# Patient Record
Sex: FEMALE | Race: WHITE | NOT HISPANIC OR LATINO | URBAN - METROPOLITAN AREA
[De-identification: names, ages, dates, MRNs, and addresses within clinical notes are randomized per-mention and may not be internally consistent; named-entity substitution may affect disease eponyms.]

---

## 2023-10-02 PROCEDURE — 87506 IADNA-DNA/RNA PROBE TQ 6-11: CPT

## 2023-10-02 PROCEDURE — 87493 C DIFF AMPLIFIED PROBE: CPT

## 2023-10-03 ENCOUNTER — LAB (OUTPATIENT)
Dept: LAB | Facility: LAB | Age: 43
End: 2023-10-03
Payer: MEDICAID

## 2023-10-03 DIAGNOSIS — Z87.19 PERSONAL HISTORY OF OTHER DISEASES OF THE DIGESTIVE SYSTEM: Primary | ICD-10-CM

## 2023-10-03 LAB
ALBUMIN SERPL BCP-MCNC: 3.7 G/DL (ref 3.4–5)
ALP SERPL-CCNC: 45 U/L (ref 33–110)
ALT SERPL W P-5'-P-CCNC: 8 U/L (ref 7–45)
ANION GAP SERPL CALC-SCNC: 11 MMOL/L (ref 10–20)
AST SERPL W P-5'-P-CCNC: 10 U/L (ref 9–39)
BASOPHILS # BLD AUTO: 0.1 X10*3/UL (ref 0–0.1)
BASOPHILS NFR BLD AUTO: 1 %
BILIRUB SERPL-MCNC: 0.3 MG/DL (ref 0–1.2)
BUN SERPL-MCNC: 8 MG/DL (ref 6–23)
CALCIUM SERPL-MCNC: 8.9 MG/DL (ref 8.6–10.3)
CHLORIDE SERPL-SCNC: 101 MMOL/L (ref 98–107)
CO2 SERPL-SCNC: 29 MMOL/L (ref 21–32)
CREAT SERPL-MCNC: 0.78 MG/DL (ref 0.5–1.05)
EOSINOPHIL # BLD AUTO: 0.51 X10*3/UL (ref 0–0.7)
EOSINOPHIL NFR BLD AUTO: 5 %
ERYTHROCYTE [DISTWIDTH] IN BLOOD BY AUTOMATED COUNT: 12 % (ref 11.5–14.5)
GFR SERPL CREATININE-BSD FRML MDRD: >90 ML/MIN/1.73M*2
GLUCOSE SERPL-MCNC: 93 MG/DL (ref 74–99)
HCT VFR BLD AUTO: 40.6 % (ref 36–46)
HGB BLD-MCNC: 13.2 G/DL (ref 12–16)
IMM GRANULOCYTES # BLD AUTO: 0.17 X10*3/UL (ref 0–0.7)
IMM GRANULOCYTES NFR BLD AUTO: 1.7 % (ref 0–0.9)
LYMPHOCYTES # BLD AUTO: 0.56 X10*3/UL (ref 1.2–4.8)
LYMPHOCYTES NFR BLD AUTO: 5.5 %
MCH RBC QN AUTO: 30.7 PG (ref 26–34)
MCHC RBC AUTO-ENTMCNC: 32.5 G/DL (ref 32–36)
MCV RBC AUTO: 94 FL (ref 80–100)
MONOCYTES # BLD AUTO: 1.45 X10*3/UL (ref 0.1–1)
MONOCYTES NFR BLD AUTO: 14.3 %
NEUTROPHILS # BLD AUTO: 7.34 X10*3/UL (ref 1.2–7.7)
NEUTROPHILS NFR BLD AUTO: 72.5 %
NRBC BLD-RTO: 0 /100 WBCS (ref 0–0)
PLATELET # BLD AUTO: 465 X10*3/UL (ref 150–450)
PMV BLD AUTO: 10 FL (ref 7.5–11.5)
POTASSIUM SERPL-SCNC: 3.9 MMOL/L (ref 3.5–5.3)
PROT SERPL-MCNC: 6.6 G/DL (ref 6.4–8.2)
RBC # BLD AUTO: 4.3 X10*6/UL (ref 4–5.2)
SODIUM SERPL-SCNC: 137 MMOL/L (ref 136–145)
WBC # BLD AUTO: 10.1 X10*3/UL (ref 4.4–11.3)

## 2023-10-03 PROCEDURE — 36415 COLL VENOUS BLD VENIPUNCTURE: CPT

## 2023-10-03 PROCEDURE — 85025 COMPLETE CBC W/AUTO DIFF WBC: CPT

## 2023-10-03 PROCEDURE — 80053 COMPREHEN METABOLIC PANEL: CPT

## 2023-10-04 LAB
C COLI+JEJ+UPSA DNA STL QL NAA+PROBE: NOT DETECTED
C DIF TOX TCDA+TCDB STL QL NAA+PROBE: NOT DETECTED
EC STX1 GENE STL QL NAA+PROBE: NOT DETECTED
EC STX2 GENE STL QL NAA+PROBE: NOT DETECTED
NOROVIRUS GI + GII RNA STL NAA+PROBE: NOT DETECTED
RV RNA STL NAA+PROBE: NOT DETECTED
SALMONELLA DNA STL QL NAA+PROBE: NOT DETECTED
SHIGELLA DNA SPEC QL NAA+PROBE: NOT DETECTED
V CHOLERAE DNA STL QL NAA+PROBE: NOT DETECTED
Y ENTEROCOL DNA STL QL NAA+PROBE: NOT DETECTED

## 2023-10-09 ENCOUNTER — HOSPITAL ENCOUNTER (INPATIENT)
Facility: HOSPITAL | Age: 43
LOS: 5 days | Discharge: HOME | End: 2023-10-14
Attending: STUDENT IN AN ORGANIZED HEALTH CARE EDUCATION/TRAINING PROGRAM | Admitting: FAMILY MEDICINE
Payer: MEDICAID

## 2023-10-09 ENCOUNTER — APPOINTMENT (OUTPATIENT)
Dept: RADIOLOGY | Facility: HOSPITAL | Age: 43
End: 2023-10-09
Payer: MEDICAID

## 2023-10-09 DIAGNOSIS — K52.9 INFLAMMATORY BOWEL DISEASE: Primary | ICD-10-CM

## 2023-10-09 LAB
ALBUMIN SERPL-MCNC: 3.6 G/DL (ref 3.5–5)
ALP BLD-CCNC: 81 U/L (ref 35–125)
ALT SERPL-CCNC: 24 U/L (ref 5–40)
ANION GAP SERPL CALC-SCNC: 10 MMOL/L
APPEARANCE UR: ABNORMAL
AST SERPL-CCNC: 29 U/L (ref 5–40)
BASOPHILS # BLD MANUAL: 0 X10*3/UL (ref 0–0.1)
BASOPHILS NFR BLD MANUAL: 0 %
BILIRUB SERPL-MCNC: 0.3 MG/DL (ref 0.1–1.2)
BILIRUB UR STRIP.AUTO-MCNC: NEGATIVE MG/DL
BUN SERPL-MCNC: 12 MG/DL (ref 8–25)
CALCIUM SERPL-MCNC: 9.6 MG/DL (ref 8.5–10.4)
CHLORIDE SERPL-SCNC: 99 MMOL/L (ref 97–107)
CO2 SERPL-SCNC: 26 MMOL/L (ref 24–31)
COLOR UR: YELLOW
CREAT SERPL-MCNC: 0.7 MG/DL (ref 0.4–1.6)
CREAT SERPL-MCNC: 0.7 MG/DL (ref 0.4–1.6)
CRP SERPL-MCNC: 4.5 MG/DL (ref 0–2)
EOSINOPHIL # BLD MANUAL: 0 X10*3/UL (ref 0–0.7)
EOSINOPHIL NFR BLD MANUAL: 0 %
ERYTHROCYTE [DISTWIDTH] IN BLOOD BY AUTOMATED COUNT: 12 % (ref 11.5–14.5)
ERYTHROCYTE [SEDIMENTATION RATE] IN BLOOD BY WESTERGREN METHOD: 40 MM/H (ref 0–20)
GFR SERPL CREATININE-BSD FRML MDRD: >90 ML/MIN/1.73M*2
GFR SERPL CREATININE-BSD FRML MDRD: >90 ML/MIN/1.73M*2
GLUCOSE SERPL-MCNC: 136 MG/DL (ref 65–99)
GLUCOSE UR STRIP.AUTO-MCNC: NORMAL MG/DL
HCT VFR BLD AUTO: 41.8 % (ref 36–46)
HGB BLD-MCNC: 13.9 G/DL (ref 12–16)
IMM GRANULOCYTES # BLD AUTO: 1.57 X10*3/UL (ref 0–0.7)
IMM GRANULOCYTES NFR BLD AUTO: 11.6 % (ref 0–0.9)
KETONES UR STRIP.AUTO-MCNC: NEGATIVE MG/DL
LACTATE BLDV-SCNC: 1.2 MMOL/L (ref 0.4–2)
LEUKOCYTE ESTERASE UR QL STRIP.AUTO: NEGATIVE
LIPASE SERPL-CCNC: 33 U/L (ref 16–63)
LYMPHOCYTES # BLD MANUAL: 0 X10*3/UL (ref 1.2–4.8)
LYMPHOCYTES NFR BLD MANUAL: 0 %
MCH RBC QN AUTO: 30.3 PG (ref 26–34)
MCHC RBC AUTO-ENTMCNC: 33.3 G/DL (ref 32–36)
MCV RBC AUTO: 91 FL (ref 80–100)
METAMYELOCYTES # BLD MANUAL: 0.68 X10*3/UL
METAMYELOCYTES NFR BLD MANUAL: 5 %
MONOCYTES # BLD MANUAL: 0.27 X10*3/UL (ref 0.1–1)
MONOCYTES NFR BLD MANUAL: 2 %
MYELOCYTES # BLD MANUAL: 1.09 X10*3/UL
MYELOCYTES NFR BLD MANUAL: 8 %
NEUTROPHILS # BLD MANUAL: 11.56 X10*3/UL (ref 1.2–7.7)
NEUTS BAND # BLD MANUAL: 3.67 X10*3/UL (ref 0–0.7)
NEUTS BAND NFR BLD MANUAL: 27 %
NEUTS SEG # BLD MANUAL: 7.89 X10*3/UL (ref 1.2–7)
NEUTS SEG NFR BLD MANUAL: 58 %
NITRITE UR QL STRIP.AUTO: NEGATIVE
NRBC BLD-RTO: 0 /100 WBCS (ref 0–0)
PH UR STRIP.AUTO: 7 [PH]
PLATELET # BLD AUTO: 493 X10*3/UL (ref 150–450)
PMV BLD AUTO: 9.9 FL (ref 7.5–11.5)
POTASSIUM SERPL-SCNC: 4.1 MMOL/L (ref 3.4–5.1)
PROT SERPL-MCNC: 6.9 G/DL (ref 5.9–7.9)
PROT UR STRIP.AUTO-MCNC: NEGATIVE MG/DL
RBC # BLD AUTO: 4.59 X10*6/UL (ref 4–5.2)
RBC # UR STRIP.AUTO: NEGATIVE /UL
RBC MORPH BLD: ABNORMAL
SODIUM SERPL-SCNC: 135 MMOL/L (ref 133–145)
SP GR UR STRIP.AUTO: 1.03
TOTAL CELLS COUNTED BLD: 100
UROBILINOGEN UR STRIP.AUTO-MCNC: NORMAL MG/DL
WBC # BLD AUTO: 13.6 X10*3/UL (ref 4.4–11.3)

## 2023-10-09 PROCEDURE — 80053 COMPREHEN METABOLIC PANEL: CPT | Performed by: STUDENT IN AN ORGANIZED HEALTH CARE EDUCATION/TRAINING PROGRAM

## 2023-10-09 PROCEDURE — 81003 URINALYSIS AUTO W/O SCOPE: CPT | Performed by: STUDENT IN AN ORGANIZED HEALTH CARE EDUCATION/TRAINING PROGRAM

## 2023-10-09 PROCEDURE — 1100000001 HC PRIVATE ROOM DAILY

## 2023-10-09 PROCEDURE — 87075 CULTR BACTERIA EXCEPT BLOOD: CPT | Mod: TRILAB | Performed by: CLINICAL NURSE SPECIALIST

## 2023-10-09 PROCEDURE — 85027 COMPLETE CBC AUTOMATED: CPT | Performed by: STUDENT IN AN ORGANIZED HEALTH CARE EDUCATION/TRAINING PROGRAM

## 2023-10-09 PROCEDURE — 96374 THER/PROPH/DIAG INJ IV PUSH: CPT | Mod: 59

## 2023-10-09 PROCEDURE — 2500000004 HC RX 250 GENERAL PHARMACY W/ HCPCS (ALT 636 FOR OP/ED): Performed by: STUDENT IN AN ORGANIZED HEALTH CARE EDUCATION/TRAINING PROGRAM

## 2023-10-09 PROCEDURE — 99285 EMERGENCY DEPT VISIT HI MDM: CPT | Performed by: STUDENT IN AN ORGANIZED HEALTH CARE EDUCATION/TRAINING PROGRAM

## 2023-10-09 PROCEDURE — 83605 ASSAY OF LACTIC ACID: CPT | Performed by: CLINICAL NURSE SPECIALIST

## 2023-10-09 PROCEDURE — 2550000001 HC RX 255 CONTRASTS: Performed by: STUDENT IN AN ORGANIZED HEALTH CARE EDUCATION/TRAINING PROGRAM

## 2023-10-09 PROCEDURE — 36415 COLL VENOUS BLD VENIPUNCTURE: CPT | Performed by: CLINICAL NURSE SPECIALIST

## 2023-10-09 PROCEDURE — 86140 C-REACTIVE PROTEIN: CPT | Performed by: STUDENT IN AN ORGANIZED HEALTH CARE EDUCATION/TRAINING PROGRAM

## 2023-10-09 PROCEDURE — 74177 CT ABD & PELVIS W/CONTRAST: CPT

## 2023-10-09 PROCEDURE — 85007 BL SMEAR W/DIFF WBC COUNT: CPT | Performed by: STUDENT IN AN ORGANIZED HEALTH CARE EDUCATION/TRAINING PROGRAM

## 2023-10-09 PROCEDURE — 87040 BLOOD CULTURE FOR BACTERIA: CPT | Mod: CMCLAB,TRILAB | Performed by: CLINICAL NURSE SPECIALIST

## 2023-10-09 PROCEDURE — 36415 COLL VENOUS BLD VENIPUNCTURE: CPT | Performed by: STUDENT IN AN ORGANIZED HEALTH CARE EDUCATION/TRAINING PROGRAM

## 2023-10-09 PROCEDURE — 85652 RBC SED RATE AUTOMATED: CPT | Performed by: STUDENT IN AN ORGANIZED HEALTH CARE EDUCATION/TRAINING PROGRAM

## 2023-10-09 PROCEDURE — 96361 HYDRATE IV INFUSION ADD-ON: CPT

## 2023-10-09 PROCEDURE — 82565 ASSAY OF CREATININE: CPT | Performed by: CLINICAL NURSE SPECIALIST

## 2023-10-09 PROCEDURE — 96375 TX/PRO/DX INJ NEW DRUG ADDON: CPT

## 2023-10-09 PROCEDURE — 2500000004 HC RX 250 GENERAL PHARMACY W/ HCPCS (ALT 636 FOR OP/ED): Performed by: CLINICAL NURSE SPECIALIST

## 2023-10-09 PROCEDURE — 96372 THER/PROPH/DIAG INJ SC/IM: CPT

## 2023-10-09 PROCEDURE — 2500000001 HC RX 250 WO HCPCS SELF ADMINISTERED DRUGS (ALT 637 FOR MEDICARE OP): Performed by: STUDENT IN AN ORGANIZED HEALTH CARE EDUCATION/TRAINING PROGRAM

## 2023-10-09 PROCEDURE — 96376 TX/PRO/DX INJ SAME DRUG ADON: CPT

## 2023-10-09 PROCEDURE — 83690 ASSAY OF LIPASE: CPT | Performed by: CLINICAL NURSE SPECIALIST

## 2023-10-09 RX ORDER — ONDANSETRON HYDROCHLORIDE 2 MG/ML
4 INJECTION, SOLUTION INTRAVENOUS ONCE
Status: COMPLETED | OUTPATIENT
Start: 2023-10-09 | End: 2023-10-09

## 2023-10-09 RX ORDER — LOPERAMIDE HYDROCHLORIDE 2 MG/1
2 CAPSULE ORAL 4 TIMES DAILY PRN
Status: DISCONTINUED | OUTPATIENT
Start: 2023-10-09 | End: 2023-10-14 | Stop reason: HOSPADM

## 2023-10-09 RX ORDER — ONDANSETRON HYDROCHLORIDE 2 MG/ML
4 INJECTION, SOLUTION INTRAVENOUS EVERY 8 HOURS PRN
Status: DISCONTINUED | OUTPATIENT
Start: 2023-10-09 | End: 2023-10-14 | Stop reason: HOSPADM

## 2023-10-09 RX ORDER — OXYCODONE AND ACETAMINOPHEN 5; 325 MG/1; MG/1
1 TABLET ORAL ONCE
Status: COMPLETED | OUTPATIENT
Start: 2023-10-09 | End: 2023-10-09

## 2023-10-09 RX ORDER — SUCRALFATE 1 G/10ML
1 SUSPENSION ORAL ONCE
Status: COMPLETED | OUTPATIENT
Start: 2023-10-09 | End: 2023-10-09

## 2023-10-09 RX ORDER — DICYCLOMINE HYDROCHLORIDE 10 MG/ML
20 INJECTION INTRAMUSCULAR ONCE
Status: COMPLETED | OUTPATIENT
Start: 2023-10-09 | End: 2023-10-09

## 2023-10-09 RX ORDER — SODIUM CHLORIDE 9 MG/ML
125 INJECTION, SOLUTION INTRAVENOUS CONTINUOUS
Status: DISCONTINUED | OUTPATIENT
Start: 2023-10-09 | End: 2023-10-11

## 2023-10-09 RX ADMIN — SUCRALFATE ORAL 1 G: 1 SUSPENSION ORAL at 16:46

## 2023-10-09 RX ADMIN — ONDANSETRON 4 MG: 2 INJECTION INTRAMUSCULAR; INTRAVENOUS at 22:19

## 2023-10-09 RX ADMIN — ONDANSETRON 4 MG: 2 INJECTION INTRAMUSCULAR; INTRAVENOUS at 16:45

## 2023-10-09 RX ADMIN — SODIUM CHLORIDE 1000 ML: 900 INJECTION, SOLUTION INTRAVENOUS at 16:45

## 2023-10-09 RX ADMIN — DICYCLOMINE HYDROCHLORIDE 20 MG: 20 INJECTION, SOLUTION INTRAMUSCULAR at 16:45

## 2023-10-09 RX ADMIN — IOHEXOL 75 ML: 350 INJECTION, SOLUTION INTRAVENOUS at 17:38

## 2023-10-09 RX ADMIN — METHYLPREDNISOLONE SODIUM SUCCINATE 20 MG: 40 INJECTION, POWDER, FOR SOLUTION INTRAMUSCULAR; INTRAVENOUS at 22:13

## 2023-10-09 RX ADMIN — OXYCODONE HYDROCHLORIDE AND ACETAMINOPHEN 1 TABLET: 5; 325 TABLET ORAL at 22:13

## 2023-10-09 RX ADMIN — SODIUM CHLORIDE 1000 ML: 900 INJECTION, SOLUTION INTRAVENOUS at 22:13

## 2023-10-09 ASSESSMENT — COLUMBIA-SUICIDE SEVERITY RATING SCALE - C-SSRS
6. HAVE YOU EVER DONE ANYTHING, STARTED TO DO ANYTHING, OR PREPARED TO DO ANYTHING TO END YOUR LIFE?: NO
2. HAVE YOU ACTUALLY HAD ANY THOUGHTS OF KILLING YOURSELF?: NO
2. HAVE YOU ACTUALLY HAD ANY THOUGHTS OF KILLING YOURSELF?: NO
6. HAVE YOU EVER DONE ANYTHING, STARTED TO DO ANYTHING, OR PREPARED TO DO ANYTHING TO END YOUR LIFE?: NO
1. IN THE PAST MONTH, HAVE YOU WISHED YOU WERE DEAD OR WISHED YOU COULD GO TO SLEEP AND NOT WAKE UP?: NO
1. IN THE PAST MONTH, HAVE YOU WISHED YOU WERE DEAD OR WISHED YOU COULD GO TO SLEEP AND NOT WAKE UP?: NO

## 2023-10-09 ASSESSMENT — PAIN SCALES - GENERAL
PAINLEVEL_OUTOF10: 5 - MODERATE PAIN
PAINLEVEL_OUTOF10: 0 - NO PAIN

## 2023-10-09 ASSESSMENT — PAIN DESCRIPTION - PAIN TYPE: TYPE: ACUTE PAIN

## 2023-10-09 ASSESSMENT — PAIN - FUNCTIONAL ASSESSMENT
PAIN_FUNCTIONAL_ASSESSMENT: 0-10
PAIN_FUNCTIONAL_ASSESSMENT: 0-10

## 2023-10-09 ASSESSMENT — PAIN DESCRIPTION - DESCRIPTORS: DESCRIPTORS: PRESSURE

## 2023-10-09 ASSESSMENT — PAIN DESCRIPTION - LOCATION: LOCATION: ABDOMEN

## 2023-10-09 ASSESSMENT — PAIN DESCRIPTION - PROGRESSION: CLINICAL_PROGRESSION: NOT CHANGED

## 2023-10-09 ASSESSMENT — PAIN DESCRIPTION - ORIENTATION: ORIENTATION: LOWER

## 2023-10-09 NOTE — ED PROVIDER NOTES
HPI   Chief Complaint   Patient presents with    Abdominal Pain       Patient presents with abdominal pain.  She states that she is having a flare of her ulcerative colitis and has been ongoing for the last 7 weeks.  On Wednesday, she started taking prednisone, 40 mg and is also on daily controlling medications.  Her gastroenterologist is Dr. Stephen, however she saw Dr. Neves on Wednesday and had a flexible sigmoidoscopy which revealed significant inflammation.  Despite starting steroids, her symptoms have been worsening.  She reports having about 40 small bowel movements daily of blood and mucus.  She has been eating and drinking and has been nauseated but has not vomited.  The pain is worst in the lower portions of the abdomen.                          No data recorded                Patient History   No past medical history on file.  No past surgical history on file.  No family history on file.  Social History     Tobacco Use    Smoking status: Not on file    Smokeless tobacco: Not on file   Substance Use Topics    Alcohol use: Not on file    Drug use: Not on file       Physical Exam   ED Triage Vitals [10/09/23 1554]   Temp Heart Rate Resp BP   36.8 °C (98.2 °F) 105 18 131/64      SpO2 Temp Source Heart Rate Source Patient Position   99 % Oral -- --      BP Location FiO2 (%)     -- --       Physical Exam  HENT:      Head: Normocephalic.   Cardiovascular:      Rate and Rhythm: Tachycardia present.   Abdominal:      Comments: Right and left lower quadrant tenderness to palpation without guarding   Neurological:      Mental Status: She is alert.         ED Course & MDM   Diagnoses as of 10/09/23 8168   Inflammatory bowel disease       Medical Decision Making  Patient presents with inflammatory bowel disease flare.  Laboratory studies reveal mildly elevated white blood cell count, ESR, and CRP.  CAT scan reveals colitis.  I discussed patient's case with gastroenterology, Dr. Huber, who recommends that patient  be given Solu-Medrol 20 mg every 8 hours and be placed on clear liquid diet and admitted for further evaluation.  Patient then accepted to hospitalist service for further management.  I suspicion for bowel obstruction, bowel ischemia, perforation, AAA, aortic dissection, appendicitis, cholecystitis as etiology of symptoms is very low.  Parts of this chart were completed with dictation software, please excuse any errors in transcription.        Procedure  Procedures     Kale Rose MD  10/09/23 6721

## 2023-10-09 NOTE — ED TRIAGE NOTES
Presents to ED for lower abdominal pain.  States history of ulcerative colitis.  States partial colonoscopy last Wednesday and was told that she is severely inflamed.

## 2023-10-09 NOTE — ED TRIAGE NOTES
43-year-old female with history of colitis presents to the emergency department complaints of frequent bloody diarrhea.  Onset of symptoms last week Monday.  Patient reports he had stool studies that were obtained because she has had C. difficile 3 times and has been diagnosed with colitis in the last 3 years.  The stool studies were negative.  She had a sigmoid flex colonoscopy on Wednesday by Dr. Vazquez and noted to be very inflamed.  She had a repeat of her stool studies at that time  Since then she has had at least 40 episodes of diarrhea throughout the day that is liquid bloody mucousy with little stool generalized abdominal pain nausea sweating.  She notified the gastroenterologist office and advised her to come to the emergency department for evaluation and possible IV steroids      Clinical presentation   General ill-appearing female pale  Heart: Regular rate and rhythm S1-S2 no murmurs rubs or gallops  Lungs: Clear to auscultation bilateral equal chest rise  Abdomen: Soft diffuse tenderness no rebound or guarding negative Sims sign Vesper's point tenderness.  No peritoneal signs noted.  Skin pale warm and dry  Differentials include but not limited to abscess versus perforation versus ulcerative colitis versus gastroenteritis versus C. difficile versus bacterial infection versus viral illness    Plan:  CT abdomen pelvis  IV fluids  CBC  CMP  Lipase  Urine

## 2023-10-10 LAB
BASOPHILS # BLD MANUAL: 0 X10*3/UL (ref 0–0.1)
BASOPHILS NFR BLD MANUAL: 0 %
C DIF TOX TCDA+TCDB STL QL NAA+PROBE: NOT DETECTED
EOSINOPHIL # BLD MANUAL: 0.16 X10*3/UL (ref 0–0.7)
EOSINOPHIL NFR BLD MANUAL: 1 %
ERYTHROCYTE [DISTWIDTH] IN BLOOD BY AUTOMATED COUNT: 11.9 % (ref 11.5–14.5)
HCG UR QL IA.RAPID: NEGATIVE
HCT VFR BLD AUTO: 35.4 % (ref 36–46)
HGB BLD-MCNC: 11.7 G/DL (ref 12–16)
IMM GRANULOCYTES # BLD AUTO: 1.49 X10*3/UL (ref 0–0.7)
IMM GRANULOCYTES NFR BLD AUTO: 9.1 % (ref 0–0.9)
LYMPHOCYTES # BLD MANUAL: 1.64 X10*3/UL (ref 1.2–4.8)
LYMPHOCYTES NFR BLD MANUAL: 10 %
MCH RBC QN AUTO: 30.1 PG (ref 26–34)
MCHC RBC AUTO-ENTMCNC: 33.1 G/DL (ref 32–36)
MCV RBC AUTO: 91 FL (ref 80–100)
METAMYELOCYTES # BLD MANUAL: 0.98 X10*3/UL
METAMYELOCYTES NFR BLD MANUAL: 6 %
MONOCYTES # BLD MANUAL: 0.82 X10*3/UL (ref 0.1–1)
MONOCYTES NFR BLD MANUAL: 5 %
MYELOCYTES # BLD MANUAL: 0.66 X10*3/UL
MYELOCYTES NFR BLD MANUAL: 4 %
NEUTROPHILS # BLD MANUAL: 12.14 X10*3/UL (ref 1.2–7.7)
NEUTS BAND # BLD MANUAL: 1.97 X10*3/UL (ref 0–0.7)
NEUTS BAND NFR BLD MANUAL: 12 %
NEUTS SEG # BLD MANUAL: 10.17 X10*3/UL (ref 1.2–7)
NEUTS SEG NFR BLD MANUAL: 62 %
NRBC BLD-RTO: 0 /100 WBCS (ref 0–0)
PLATELET # BLD AUTO: 443 X10*3/UL (ref 150–450)
PMV BLD AUTO: 10.1 FL (ref 7.5–11.5)
RBC # BLD AUTO: 3.89 X10*6/UL (ref 4–5.2)
RBC MORPH BLD: ABNORMAL
TOTAL CELLS COUNTED BLD: 100
WBC # BLD AUTO: 16.4 X10*3/UL (ref 4.4–11.3)

## 2023-10-10 PROCEDURE — 36415 COLL VENOUS BLD VENIPUNCTURE: CPT | Performed by: FAMILY MEDICINE

## 2023-10-10 PROCEDURE — 1100000001 HC PRIVATE ROOM DAILY

## 2023-10-10 PROCEDURE — 81025 URINE PREGNANCY TEST: CPT | Performed by: CLINICAL NURSE SPECIALIST

## 2023-10-10 PROCEDURE — 87493 C DIFF AMPLIFIED PROBE: CPT | Performed by: FAMILY MEDICINE

## 2023-10-10 PROCEDURE — 85027 COMPLETE CBC AUTOMATED: CPT | Performed by: FAMILY MEDICINE

## 2023-10-10 PROCEDURE — 85007 BL SMEAR W/DIFF WBC COUNT: CPT | Performed by: FAMILY MEDICINE

## 2023-10-10 PROCEDURE — 2500000004 HC RX 250 GENERAL PHARMACY W/ HCPCS (ALT 636 FOR OP/ED): Performed by: FAMILY MEDICINE

## 2023-10-10 PROCEDURE — 2500000004 HC RX 250 GENERAL PHARMACY W/ HCPCS (ALT 636 FOR OP/ED): Performed by: NURSE PRACTITIONER

## 2023-10-10 PROCEDURE — 2500000001 HC RX 250 WO HCPCS SELF ADMINISTERED DRUGS (ALT 637 FOR MEDICARE OP): Performed by: NURSE PRACTITIONER

## 2023-10-10 RX ORDER — PANTOPRAZOLE SODIUM 40 MG/1
40 TABLET, DELAYED RELEASE ORAL
Status: DISCONTINUED | OUTPATIENT
Start: 2023-10-10 | End: 2023-10-14 | Stop reason: HOSPADM

## 2023-10-10 RX ORDER — OXYCODONE AND ACETAMINOPHEN 5; 325 MG/1; MG/1
1 TABLET ORAL EVERY 4 HOURS PRN
Status: DISCONTINUED | OUTPATIENT
Start: 2023-10-10 | End: 2023-10-14 | Stop reason: HOSPADM

## 2023-10-10 RX ORDER — MORPHINE SULFATE 2 MG/ML
1 INJECTION, SOLUTION INTRAMUSCULAR; INTRAVENOUS EVERY 4 HOURS PRN
Status: DISCONTINUED | OUTPATIENT
Start: 2023-10-10 | End: 2023-10-14 | Stop reason: HOSPADM

## 2023-10-10 RX ORDER — PREDNISONE 20 MG/1
40 TABLET ORAL DAILY
COMMUNITY

## 2023-10-10 RX ADMIN — OXYCODONE AND ACETAMINOPHEN 1 TABLET: 5; 325 TABLET ORAL at 18:24

## 2023-10-10 RX ADMIN — METHYLPREDNISOLONE SODIUM SUCCINATE 20 MG: 40 INJECTION INTRAMUSCULAR; INTRAVENOUS at 14:36

## 2023-10-10 RX ADMIN — METHYLPREDNISOLONE SODIUM SUCCINATE 20 MG: 40 INJECTION INTRAMUSCULAR; INTRAVENOUS at 00:29

## 2023-10-10 RX ADMIN — METHYLPREDNISOLONE SODIUM SUCCINATE 20 MG: 40 INJECTION INTRAMUSCULAR; INTRAVENOUS at 23:25

## 2023-10-10 RX ADMIN — ONDANSETRON 4 MG: 2 INJECTION INTRAMUSCULAR; INTRAVENOUS at 18:27

## 2023-10-10 RX ADMIN — SODIUM CHLORIDE 125 ML/HR: 900 INJECTION, SOLUTION INTRAVENOUS at 00:28

## 2023-10-10 RX ADMIN — SODIUM CHLORIDE 125 ML/HR: 900 INJECTION, SOLUTION INTRAVENOUS at 06:06

## 2023-10-10 RX ADMIN — OXYCODONE AND ACETAMINOPHEN 1 TABLET: 5; 325 TABLET ORAL at 10:16

## 2023-10-10 RX ADMIN — ONDANSETRON 4 MG: 2 INJECTION INTRAMUSCULAR; INTRAVENOUS at 09:50

## 2023-10-10 RX ADMIN — SODIUM CHLORIDE 125 ML/HR: 900 INJECTION, SOLUTION INTRAVENOUS at 14:11

## 2023-10-10 RX ADMIN — METHYLPREDNISOLONE SODIUM SUCCINATE 20 MG: 40 INJECTION INTRAMUSCULAR; INTRAVENOUS at 06:06

## 2023-10-10 RX ADMIN — PANTOPRAZOLE SODIUM 40 MG: 40 TABLET, DELAYED RELEASE ORAL at 12:36

## 2023-10-10 SDOH — SOCIAL STABILITY: SOCIAL INSECURITY: HAS ANYONE EVER THREATENED TO HURT YOUR FAMILY OR YOUR PETS?: NO

## 2023-10-10 SDOH — SOCIAL STABILITY: SOCIAL INSECURITY: ARE THERE ANY APPARENT SIGNS OF INJURIES/BEHAVIORS THAT COULD BE RELATED TO ABUSE/NEGLECT?: NO

## 2023-10-10 SDOH — SOCIAL STABILITY: SOCIAL INSECURITY: WERE YOU ABLE TO COMPLETE ALL THE BEHAVIORAL HEALTH SCREENINGS?: YES

## 2023-10-10 SDOH — SOCIAL STABILITY: SOCIAL INSECURITY: DO YOU FEEL UNSAFE GOING BACK TO THE PLACE WHERE YOU ARE LIVING?: NO

## 2023-10-10 SDOH — SOCIAL STABILITY: SOCIAL INSECURITY: DO YOU FEEL ANYONE HAS EXPLOITED OR TAKEN ADVANTAGE OF YOU FINANCIALLY OR OF YOUR PERSONAL PROPERTY?: NO

## 2023-10-10 SDOH — SOCIAL STABILITY: SOCIAL INSECURITY: DOES ANYONE TRY TO KEEP YOU FROM HAVING/CONTACTING OTHER FRIENDS OR DOING THINGS OUTSIDE YOUR HOME?: NO

## 2023-10-10 SDOH — SOCIAL STABILITY: SOCIAL INSECURITY: ARE YOU OR HAVE YOU BEEN THREATENED OR ABUSED PHYSICALLY, EMOTIONALLY, OR SEXUALLY BY ANYONE?: NO

## 2023-10-10 SDOH — SOCIAL STABILITY: SOCIAL INSECURITY: ABUSE: ADULT

## 2023-10-10 SDOH — SOCIAL STABILITY: SOCIAL INSECURITY: HAVE YOU HAD THOUGHTS OF HARMING ANYONE ELSE?: NO

## 2023-10-10 SDOH — SOCIAL STABILITY: SOCIAL INSECURITY

## 2023-10-10 ASSESSMENT — COGNITIVE AND FUNCTIONAL STATUS - GENERAL
MOBILITY SCORE: 24
DAILY ACTIVITIY SCORE: 24
MOBILITY SCORE: 24
DAILY ACTIVITIY SCORE: 24
PATIENT BASELINE BEDBOUND: NO
MOBILITY SCORE: 24
DAILY ACTIVITIY SCORE: 24
DAILY ACTIVITIY SCORE: 24
MOBILITY SCORE: 24

## 2023-10-10 ASSESSMENT — PAIN SCALES - GENERAL
PAINLEVEL_OUTOF10: 0 - NO PAIN
PAINLEVEL_OUTOF10: 7
PAINLEVEL_OUTOF10: 0 - NO PAIN
PAINLEVEL_OUTOF10: 7
PAINLEVEL_OUTOF10: 0 - NO PAIN

## 2023-10-10 ASSESSMENT — ACTIVITIES OF DAILY LIVING (ADL)
PATIENT'S MEMORY ADEQUATE TO SAFELY COMPLETE DAILY ACTIVITIES?: YES
HEARING - LEFT EAR: FUNCTIONAL
ADEQUATE_TO_COMPLETE_ADL: YES
HEARING - RIGHT EAR: FUNCTIONAL
LACK_OF_TRANSPORTATION: NO
BATHING: INDEPENDENT
FEEDING YOURSELF: INDEPENDENT
WALKS IN HOME: INDEPENDENT
DRESSING YOURSELF: INDEPENDENT
GROOMING: INDEPENDENT
TOILETING: INDEPENDENT
JUDGMENT_ADEQUATE_SAFELY_COMPLETE_DAILY_ACTIVITIES: YES

## 2023-10-10 ASSESSMENT — LIFESTYLE VARIABLES
AUDIT-C TOTAL SCORE: 3
PRESCIPTION_ABUSE_PAST_12_MONTHS: NO
HOW OFTEN DURING THE LAST YEAR HAVE YOU FAILED TO DO WHAT WAS NORMALLY EXPECTED FROM YOU BECAUSE OF DRINKING: NEVER
SKIP TO QUESTIONS 9-10: 0
HOW OFTEN DURING THE LAST YEAR HAVE YOU BEEN UNABLE TO REMEMBER WHAT HAPPENED THE NIGHT BEFORE BECAUSE YOU HAD BEEN DRINKING: NEVER
AUDIT TOTAL SCORE: 0
HOW OFTEN DURING THE LAST YEAR HAVE YOU FOUND THAT YOU WERE NOT ABLE TO STOP DRINKING ONCE YOU HAD STARTED: NEVER
AUDIT TOTAL SCORE: 3
AUDIT-C TOTAL SCORE: 3
PRESCIPTION_ABUSE_PAST_12_MONTHS: NO
SUBSTANCE_ABUSE_PAST_12_MONTHS: NO
HOW OFTEN DO YOU HAVE 6 OR MORE DRINKS ON ONE OCCASION: NEVER
HOW OFTEN DURING THE LAST YEAR HAVE YOU HAD A FEELING OF GUILT OR REMORSE AFTER DRINKING: NEVER
HAVE YOU OR SOMEONE ELSE BEEN INJURED AS A RESULT OF YOUR DRINKING: NO
PRESCIPTION_ABUSE_PAST_12_MONTHS: NO
SUBSTANCE_ABUSE_PAST_12_MONTHS: NO
HOW MANY STANDARD DRINKS CONTAINING ALCOHOL DO YOU HAVE ON A TYPICAL DAY: 3 OR 4
HAS A RELATIVE, FRIEND, DOCTOR, OR ANOTHER HEALTH PROFESSIONAL EXPRESSED CONCERN ABOUT YOUR DRINKING OR SUGGESTED YOU CUT DOWN: NO
SUBSTANCE_ABUSE_PAST_12_MONTHS: NO
HOW OFTEN DO YOU HAVE A DRINK CONTAINING ALCOHOL: 2-4 TIMES A MONTH
HOW OFTEN DURING THE LAST YEAR HAVE YOU NEEDED AN ALCOHOLIC DRINK FIRST THING IN THE MORNING TO GET YOURSELF GOING AFTER A NIGHT OF HEAVY DRINKING: NEVER

## 2023-10-10 ASSESSMENT — PATIENT HEALTH QUESTIONNAIRE - PHQ9
2. FEELING DOWN, DEPRESSED OR HOPELESS: NOT AT ALL
SUM OF ALL RESPONSES TO PHQ9 QUESTIONS 1 & 2: 0
1. LITTLE INTEREST OR PLEASURE IN DOING THINGS: NOT AT ALL

## 2023-10-10 ASSESSMENT — PAIN - FUNCTIONAL ASSESSMENT
PAIN_FUNCTIONAL_ASSESSMENT: 0-10

## 2023-10-10 NOTE — CARE PLAN
Problem: Pain  Goal: My pain/discomfort is manageable  Outcome: Progressing   The patient's goals for the shift include      The clinical goals for the shift include diarrhea managemant    Over the shift, the patient did not make progress toward the following goals. Barriers to progression include . Recommendations to address these barriers include .

## 2023-10-10 NOTE — CARE PLAN
The patient's goals for the shift include  pain management    The clinical goals for the shift include diarrhea managemant

## 2023-10-10 NOTE — PROGRESS NOTES
Patient is home with SO and is independent.  She will have no needs at discharge.    Mahi Dye RN

## 2023-10-10 NOTE — CONSULTS
"Inpatient consult to Gastroenterology  Consult performed by: Yeimi Jordan, COLBY-CNP  Consult ordered by: Kale Rose MD          Reason For Consult      History Of Present Illness  Diana De La Fuente is a 43 y.o. female presenting with ulcerative colitis.  This is a patient of Dr. Nicholas.  She was recently seen in the office last Tuesday for recurrent episodes of bloody diarrhea.  Flex sig was completed by Dr. Neves on Wednesday and showed severe colitis.  Patient was placed on prednisone orally.  Due to her persistent bloody diarrhea she was instructed to go to the ED. Patient continues to have bloody diarrhea and reports at least 6-7 episodes as of this a.m. She continues to have lower abdominal pain.  Patient denies any nausea and or vomiting.     Past Medical History  She has a past medical history of Ulcerative colitis (CMS/[facility]).    Surgical History  She has no past surgical history on file.     Social History  She reports that she has never smoked. She has never used smokeless tobacco. She reports that she does not use drugs. No history on file for alcohol use.    Family History  No family history on file.     Allergies  Nickel and Sulfa (sulfonamide antibiotics)    Review of Systems     Physical Exam     Last Recorded Vitals  Blood pressure 114/74, pulse 72, temperature 36.5 °C (97.7 °F), temperature source Oral, resp. rate 16, height 1.575 m (5' 2.01\"), weight 59.4 kg (130 lb 15.3 oz), SpO2 97 %.    Relevant Results  Results for orders placed or performed during the hospital encounter of 10/09/23 (from the past 24 hour(s))   CBC with Differential   Result Value Ref Range    WBC 13.6 (H) 4.4 - 11.3 x10*3/uL    nRBC 0.0 0.0 - 0.0 /100 WBCs    RBC 4.59 4.00 - 5.20 x10*6/uL    Hemoglobin 13.9 12.0 - 16.0 g/dL    Hematocrit 41.8 36.0 - 46.0 %    MCV 91 80 - 100 fL    MCH 30.3 26.0 - 34.0 pg    MCHC 33.3 32.0 - 36.0 g/dL    RDW 12.0 11.5 - 14.5 %    Platelets 493 (H) 150 - 450 x10*3/uL    MPV 9.9 7.5 - " 11.5 fL    Immature Granulocytes %, Automated 11.6 (H) 0.0 - 0.9 %    Immature Granulocytes Absolute, Automated 1.57 (H) 0.00 - 0.70 x10*3/uL   Comprehensive Metabolic Panel   Result Value Ref Range    Glucose 136 (H) 65 - 99 mg/dL    Sodium 135 133 - 145 mmol/L    Potassium 4.1 3.4 - 5.1 mmol/L    Chloride 99 97 - 107 mmol/L    Bicarbonate 26 24 - 31 mmol/L    Urea Nitrogen 12 8 - 25 mg/dL    Creatinine 0.70 0.40 - 1.60 mg/dL    eGFR >90 >60 mL/min/1.73m*2    Calcium 9.6 8.5 - 10.4 mg/dL    Albumin 3.6 3.5 - 5.0 g/dL    Alkaline Phosphatase 81 35 - 125 U/L    Total Protein 6.9 5.9 - 7.9 g/dL    AST 29 5 - 40 U/L    Bilirubin, Total 0.3 0.1 - 1.2 mg/dL    ALT 24 5 - 40 U/L    Anion Gap 10 <=19 mmol/L   Creatinine, Serum   Result Value Ref Range    Creatinine 0.70 0.40 - 1.60 mg/dL    eGFR >90 >60 mL/min/1.73m*2   Lipase   Result Value Ref Range    Lipase 33 16 - 63 U/L   Sedimentation rate, automated   Result Value Ref Range    Sedimentation Rate 40 (H) 0 - 20 mm/h   C-reactive protein   Result Value Ref Range    C-Reactive Protein 4.50 (H) 0.00 - 2.00 mg/dL   Manual Differential   Result Value Ref Range    Neutrophils %, Manual 58.0 40.0 - 80.0 %    Bands %, Manual 27.0 0.0 - 5.0 %    Lymphocytes %, Manual 0.0 13.0 - 44.0 %    Monocytes %, Manual 2.0 2.0 - 10.0 %    Eosinophils %, Manual 0.0 0.0 - 6.0 %    Basophils %, Manual 0.0 0.0 - 2.0 %    Metamyelocytes %, Manual 5.0 0.0 - 0.0 %    Myelocytes %, Manual 8.0 0.0 - 0.0 %    Seg Neutrophils Absolute, Manual 7.89 (H) 1.20 - 7.00 x10*3/uL    Bands Absolute, Manual 3.67 (H) 0.00 - 0.70 x10*3/uL    Lymphocytes Absolute, Manual 0.00 (L) 1.20 - 4.80 x10*3/uL    Monocytes Absolute, Manual 0.27 0.10 - 1.00 x10*3/uL    Eosinophils Absolute, Manual 0.00 0.00 - 0.70 x10*3/uL    Basophils Absolute, Manual 0.00 0.00 - 0.10 x10*3/uL    Metamyelocytes Absolute, Manual 0.68 0.00 - 0.00 x10*3/uL    Myelocytes Absolute, Manual 1.09 0.00 - 0.00 x10*3/uL    Total Cells Counted 100      Neutrophils Absolute, Manual 11.56 (H) 1.20 - 7.70 x10*3/uL    RBC Morphology No significant RBC morphology present    Urinalysis with Reflex Microscopic and Culture   Result Value Ref Range    Color, Urine Yellow Light-Yellow, Yellow, Dark-Yellow    Appearance, Urine Turbid (N) Clear    Specific Gravity, Urine 1.026 1.005 - 1.035    pH, Urine 7.0 5.0, 5.5, 6.0, 6.5, 7.0, 7.5, 8.0    Protein, Urine NEGATIVE NEGATIVE, 10 (TRACE), 20 (TRACE) mg/dL    Glucose, Urine Normal Normal mg/dL    Blood, Urine NEGATIVE NEGATIVE    Ketones, Urine NEGATIVE NEGATIVE mg/dL    Bilirubin, Urine NEGATIVE NEGATIVE    Urobilinogen, Urine Normal Normal mg/dL    Nitrite, Urine NEGATIVE NEGATIVE    Leukocyte Esterase, Urine NEGATIVE NEGATIVE   BLOOD GAS LACTIC ACID, VENOUS   Result Value Ref Range    POCT Lactate, Venous 1.2 0.4 - 2.0 mmol/L   Blood Culture    Specimen: Peripheral Venipuncture; Blood culture   Result Value Ref Range    Blood Culture Loaded on Instrument - Culture in progress    C. difficile, PCR    Specimen: Stool   Result Value Ref Range    C. difficile, PCR Not Detected Not Detected   CBC and Auto Differential   Result Value Ref Range    WBC 16.4 (H) 4.4 - 11.3 x10*3/uL    nRBC 0.0 0.0 - 0.0 /100 WBCs    RBC 3.89 (L) 4.00 - 5.20 x10*6/uL    Hemoglobin 11.7 (L) 12.0 - 16.0 g/dL    Hematocrit 35.4 (L) 36.0 - 46.0 %    MCV 91 80 - 100 fL    MCH 30.1 26.0 - 34.0 pg    MCHC 33.1 32.0 - 36.0 g/dL    RDW 11.9 11.5 - 14.5 %    Platelets 443 150 - 450 x10*3/uL    MPV 10.1 7.5 - 11.5 fL    Immature Granulocytes %, Automated 9.1 (H) 0.0 - 0.9 %    Immature Granulocytes Absolute, Automated 1.49 (H) 0.00 - 0.70 x10*3/uL   Manual Differential   Result Value Ref Range    Neutrophils %, Manual 62.0 40.0 - 80.0 %    Bands %, Manual 12.0 0.0 - 5.0 %    Lymphocytes %, Manual 10.0 13.0 - 44.0 %    Monocytes %, Manual 5.0 2.0 - 10.0 %    Eosinophils %, Manual 1.0 0.0 - 6.0 %    Basophils %, Manual 0.0 0.0 - 2.0 %    Metamyelocytes %,  Manual 6.0 0.0 - 0.0 %    Myelocytes %, Manual 4.0 0.0 - 0.0 %    Seg Neutrophils Absolute, Manual 10.17 (H) 1.20 - 7.00 x10*3/uL    Bands Absolute, Manual 1.97 (H) 0.00 - 0.70 x10*3/uL    Lymphocytes Absolute, Manual 1.64 1.20 - 4.80 x10*3/uL    Monocytes Absolute, Manual 0.82 0.10 - 1.00 x10*3/uL    Eosinophils Absolute, Manual 0.16 0.00 - 0.70 x10*3/uL    Basophils Absolute, Manual 0.00 0.00 - 0.10 x10*3/uL    Metamyelocytes Absolute, Manual 0.98 0.00 - 0.00 x10*3/uL    Myelocytes Absolute, Manual 0.66 0.00 - 0.00 x10*3/uL    Total Cells Counted 100     Neutrophils Absolute, Manual 12.14 (H) 1.20 - 7.70 x10*3/uL    RBC Morphology No significant RBC morphology present     CT abdomen pelvis w IV contrast    Result Date: 10/9/2023  Interpreted By:  Dc Jimenez, STUDY: CT ABDOMEN PELVIS W IV CONTRAST; 10/9/2023 5:37 pm   INDICATION: . Bloody diarrhea. History of recent flexible sigmoidoscopy with evidence for bowel inflammation.   COMPARISON: None..   ACCESSION NUMBER(S): [id number]   ORDERING CLINICIAN: DEVONTE PISANO   TECHNIQUE: 75 ml Omnipaque 350 was injected intravenously. CT of the Abdomen and Pelvis without and with intravenous contrast was performed. Axial, sagittal and coronal reformatted images were reviewed.   FINDINGS: Lower Chest: within normal limits.   Abdomen: Liver: within normal limits. Bile Ducts: Normal caliber. Gallbladder: No calcified gallstones. Normal caliber wall. Pancreas: within normal limits. Spleen: within normal limits. Adrenals: within normal limits. Kidneys: within normal limits.   Pelvis: No pelvic masses. Ureters: within normal limits. Bladder: within normal limits.   Bowel: There is luminal narrowing and marked wall thickening involving the sigmoid and descending colon extending to the level of the splenic flexure Mesenteric Lymph Nodes: No enlarged mesenteric lymph nodes. Peritoneum: No ascites or free air, no fluid collection. Vessels: Unremarkable. Retroperitoneum: within  normal limits. Abdominal Wall: within normal limits. Bones: within normal limits.       Findings most consistent with colitis.   MACRO: none   Signed by: Dc Jimenez 10/9/2023 6:24 PM Dictation workstation:   [id number]       Assessment/Plan     Ulcerative Colitis   -Continue IV steroids  -Continue IV fluids  -Bowel rest continue clear liquids. Lactose free diet   -Morphine ordered placed for pain PRN   -stool studies pending   -will monitor symptoms.  If symptoms persist, may consider starting patient on Remicade  -Continue PPI    Attending note:  Patient is known to our service and had recent flex sig that showed significant colitis and the left-sided biopsies are pending.  Not responded to oral steroid and Zeposia .  Slightly improved since last night but he still has multiple bowel movement.  If symptoms persist after 5 days of steroid may consider IV Remicade or referral to tertiary center.  Discussed with patient and significant other at bedside.  Avoid dairy for now.

## 2023-10-10 NOTE — PROGRESS NOTES
Diana De La Fuente is a 43 y.o. female on day 1 of admission presenting with Inflammatory bowel disease.      Subjective   Pt admitted last pm for ulcerative colitis. Pt states was up most of the night using the bathroom. Reports bloody diarrhea. Still with abd pain. No n/v. Tolerating IV steroids.         Objective     Last Recorded Vitals  /74 (BP Location: Left arm, Patient Position: Lying)   Pulse 72   Temp 36.5 °C (97.7 °F) (Oral)   Resp 16   Wt 59.4 kg (130 lb 15.3 oz)   SpO2 97%   Intake/Output last 3 Shifts:    Intake/Output Summary (Last 24 hours) at 10/10/2023 0957  Last data filed at 10/10/2023 0900  Gross per 24 hour   Intake 3970.83 ml   Output --   Net 3970.83 ml       Image Results  CT abdomen pelvis w IV contrast  Narrative: Interpreted By:  Dc Jimenez,   STUDY:  CT ABDOMEN PELVIS W IV CONTRAST; 10/9/2023 5:37 pm      INDICATION:  . Bloody diarrhea. History of recent flexible sigmoidoscopy with  evidence for bowel inflammation.      COMPARISON:  None..      ACCESSION NUMBER(S):  [id number]      ORDERING CLINICIAN:  DEVONTE PISANO      TECHNIQUE:  75 ml Omnipaque 350 was injected intravenously. CT of the Abdomen and  Pelvis without and with intravenous contrast was performed. Axial,  sagittal and coronal reformatted images were reviewed.      FINDINGS:  Lower Chest: within normal limits.      Abdomen:  Liver: within normal limits.  Bile Ducts: Normal caliber.  Gallbladder: No calcified gallstones. Normal caliber wall.  Pancreas: within normal limits.  Spleen: within normal limits.  Adrenals: within normal limits.  Kidneys: within normal limits.      Pelvis:  No pelvic masses.  Ureters: within normal limits.  Bladder: within normal limits.      Bowel: There is luminal narrowing and marked wall thickening  involving the sigmoid and descending colon extending to the level of  the splenic flexure Mesenteric Lymph Nodes: No enlarged mesenteric  lymph nodes. Peritoneum: No ascites or free  air, no fluid collection.  Vessels: Unremarkable.  Retroperitoneum: within normal limits.  Abdominal Wall: within normal limits.  Bones: within normal limits.      Impression: Findings most consistent with colitis.      MACRO:  none      Signed by: Dc Jimenez 10/9/2023 6:24 PM  Dictation workstation:   [id number]    Results for orders placed or performed during the hospital encounter of 10/09/23 (from the past 24 hour(s))   CBC with Differential   Result Value Ref Range    WBC 13.6 (H) 4.4 - 11.3 x10*3/uL    nRBC 0.0 0.0 - 0.0 /100 WBCs    RBC 4.59 4.00 - 5.20 x10*6/uL    Hemoglobin 13.9 12.0 - 16.0 g/dL    Hematocrit 41.8 36.0 - 46.0 %    MCV 91 80 - 100 fL    MCH 30.3 26.0 - 34.0 pg    MCHC 33.3 32.0 - 36.0 g/dL    RDW 12.0 11.5 - 14.5 %    Platelets 493 (H) 150 - 450 x10*3/uL    MPV 9.9 7.5 - 11.5 fL    Immature Granulocytes %, Automated 11.6 (H) 0.0 - 0.9 %    Immature Granulocytes Absolute, Automated 1.57 (H) 0.00 - 0.70 x10*3/uL   Comprehensive Metabolic Panel   Result Value Ref Range    Glucose 136 (H) 65 - 99 mg/dL    Sodium 135 133 - 145 mmol/L    Potassium 4.1 3.4 - 5.1 mmol/L    Chloride 99 97 - 107 mmol/L    Bicarbonate 26 24 - 31 mmol/L    Urea Nitrogen 12 8 - 25 mg/dL    Creatinine 0.70 0.40 - 1.60 mg/dL    eGFR >90 >60 mL/min/1.73m*2    Calcium 9.6 8.5 - 10.4 mg/dL    Albumin 3.6 3.5 - 5.0 g/dL    Alkaline Phosphatase 81 35 - 125 U/L    Total Protein 6.9 5.9 - 7.9 g/dL    AST 29 5 - 40 U/L    Bilirubin, Total 0.3 0.1 - 1.2 mg/dL    ALT 24 5 - 40 U/L    Anion Gap 10 <=19 mmol/L   Creatinine, Serum   Result Value Ref Range    Creatinine 0.70 0.40 - 1.60 mg/dL    eGFR >90 >60 mL/min/1.73m*2   Lipase   Result Value Ref Range    Lipase 33 16 - 63 U/L   Sedimentation rate, automated   Result Value Ref Range    Sedimentation Rate 40 (H) 0 - 20 mm/h   C-reactive protein   Result Value Ref Range    C-Reactive Protein 4.50 (H) 0.00 - 2.00 mg/dL   Manual Differential   Result Value Ref Range     Neutrophils %, Manual 58.0 40.0 - 80.0 %    Bands %, Manual 27.0 0.0 - 5.0 %    Lymphocytes %, Manual 0.0 13.0 - 44.0 %    Monocytes %, Manual 2.0 2.0 - 10.0 %    Eosinophils %, Manual 0.0 0.0 - 6.0 %    Basophils %, Manual 0.0 0.0 - 2.0 %    Metamyelocytes %, Manual 5.0 0.0 - 0.0 %    Myelocytes %, Manual 8.0 0.0 - 0.0 %    Seg Neutrophils Absolute, Manual 7.89 (H) 1.20 - 7.00 x10*3/uL    Bands Absolute, Manual 3.67 (H) 0.00 - 0.70 x10*3/uL    Lymphocytes Absolute, Manual 0.00 (L) 1.20 - 4.80 x10*3/uL    Monocytes Absolute, Manual 0.27 0.10 - 1.00 x10*3/uL    Eosinophils Absolute, Manual 0.00 0.00 - 0.70 x10*3/uL    Basophils Absolute, Manual 0.00 0.00 - 0.10 x10*3/uL    Metamyelocytes Absolute, Manual 0.68 0.00 - 0.00 x10*3/uL    Myelocytes Absolute, Manual 1.09 0.00 - 0.00 x10*3/uL    Total Cells Counted 100     Neutrophils Absolute, Manual 11.56 (H) 1.20 - 7.70 x10*3/uL    RBC Morphology No significant RBC morphology present    Urinalysis with Reflex Microscopic and Culture   Result Value Ref Range    Color, Urine Yellow Light-Yellow, Yellow, Dark-Yellow    Appearance, Urine Turbid (N) Clear    Specific Gravity, Urine 1.026 1.005 - 1.035    pH, Urine 7.0 5.0, 5.5, 6.0, 6.5, 7.0, 7.5, 8.0    Protein, Urine NEGATIVE NEGATIVE, 10 (TRACE), 20 (TRACE) mg/dL    Glucose, Urine Normal Normal mg/dL    Blood, Urine NEGATIVE NEGATIVE    Ketones, Urine NEGATIVE NEGATIVE mg/dL    Bilirubin, Urine NEGATIVE NEGATIVE    Urobilinogen, Urine Normal Normal mg/dL    Nitrite, Urine NEGATIVE NEGATIVE    Leukocyte Esterase, Urine NEGATIVE NEGATIVE   BLOOD GAS LACTIC ACID, VENOUS   Result Value Ref Range    POCT Lactate, Venous 1.2 0.4 - 2.0 mmol/L   Blood Culture    Specimen: Peripheral Venipuncture; Blood culture   Result Value Ref Range    Blood Culture Loaded on Instrument - Culture in progress    C. difficile, PCR    Specimen: Stool   Result Value Ref Range    C. difficile, PCR Not Detected Not Detected   CBC and Auto Differential    Result Value Ref Range    WBC 16.4 (H) 4.4 - 11.3 x10*3/uL    nRBC 0.0 0.0 - 0.0 /100 WBCs    RBC 3.89 (L) 4.00 - 5.20 x10*6/uL    Hemoglobin 11.7 (L) 12.0 - 16.0 g/dL    Hematocrit 35.4 (L) 36.0 - 46.0 %    MCV 91 80 - 100 fL    MCH 30.1 26.0 - 34.0 pg    MCHC 33.1 32.0 - 36.0 g/dL    RDW 11.9 11.5 - 14.5 %    Platelets 443 150 - 450 x10*3/uL    MPV 10.1 7.5 - 11.5 fL    Immature Granulocytes %, Automated 9.1 (H) 0.0 - 0.9 %    Immature Granulocytes Absolute, Automated 1.49 (H) 0.00 - 0.70 x10*3/uL   Manual Differential   Result Value Ref Range    Neutrophils %, Manual 62.0 40.0 - 80.0 %    Bands %, Manual 12.0 0.0 - 5.0 %    Lymphocytes %, Manual 10.0 13.0 - 44.0 %    Monocytes %, Manual 5.0 2.0 - 10.0 %    Eosinophils %, Manual 1.0 0.0 - 6.0 %    Basophils %, Manual 0.0 0.0 - 2.0 %    Metamyelocytes %, Manual 6.0 0.0 - 0.0 %    Myelocytes %, Manual 4.0 0.0 - 0.0 %    Seg Neutrophils Absolute, Manual 10.17 (H) 1.20 - 7.00 x10*3/uL    Bands Absolute, Manual 1.97 (H) 0.00 - 0.70 x10*3/uL    Lymphocytes Absolute, Manual 1.64 1.20 - 4.80 x10*3/uL    Monocytes Absolute, Manual 0.82 0.10 - 1.00 x10*3/uL    Eosinophils Absolute, Manual 0.16 0.00 - 0.70 x10*3/uL    Basophils Absolute, Manual 0.00 0.00 - 0.10 x10*3/uL    Metamyelocytes Absolute, Manual 0.98 0.00 - 0.00 x10*3/uL    Myelocytes Absolute, Manual 0.66 0.00 - 0.00 x10*3/uL    Total Cells Counted 100     Neutrophils Absolute, Manual 12.14 (H) 1.20 - 7.70 x10*3/uL    RBC Morphology No significant RBC morphology present        Physical Exam  Constitutional:       Appearance: Normal appearance. She is normal weight.   HENT:      Head: Normocephalic and atraumatic.      Nose: Nose normal.      Mouth/Throat:      Mouth: Mucous membranes are moist.      Pharynx: Oropharynx is clear.   Eyes:      Extraocular Movements: Extraocular movements intact.      Conjunctiva/sclera: Conjunctivae normal.      Pupils: Pupils are equal, round, and reactive to light.    Cardiovascular:      Rate and Rhythm: Normal rate and regular rhythm.      Pulses: Normal pulses.   Pulmonary:      Effort: Pulmonary effort is normal.      Breath sounds: Normal breath sounds.   Abdominal:      General: Abdomen is flat. Bowel sounds are normal.      Palpations: Abdomen is soft.      Tenderness: There is abdominal tenderness.   Musculoskeletal:         General: Normal range of motion.      Cervical back: Normal range of motion and neck supple.   Skin:     General: Skin is warm and dry.      Capillary Refill: Capillary refill takes less than 2 seconds.   Neurological:      General: No focal deficit present.      Mental Status: She is alert and oriented to person, place, and time.   Psychiatric:         Mood and Affect: Mood normal.           Assessment/Plan      Ulcerative Colitis  -Continue IV steroids  -Continue IV fluids  -GI to evaluate  -Tolerating clear liquids, advance per GI  -Pain control              COLBY Shukla-CNP

## 2023-10-10 NOTE — CARE PLAN
Problem: Pain  Goal: My pain/discomfort is manageable  Outcome: Progressing   The patient's goals for the shift include pain management.

## 2023-10-10 NOTE — H&P
History Of Present Illness  Diana De La Fuente is a 43 y.o. female with history of ulcerative colitis presenting with bloody diarrhea.  The patient has been having bloody bowel movements associated with abdominal pain worse in the lower abdomen and was treated for ulcerative colitis flare by her GI doctor after she had a sigmoidoscopy.  The patient was started on prednisone 40 mg daily but she continues to have mucousy liquidy bloody bowel movements, she has had about 40 of them in the past 24 hours.  She also reports poor oral intake with decreased appetite.  The patient is admitted for further evaluation     Past Medical History  Ulcerative colitis    Surgical History  She has no past surgical history on file.     Social History  She reports that she has never smoked. She has never used smokeless tobacco. No history on file for alcohol use and drug use.    Family History  No history of premature coronary artery disease     Allergies  Nickel and Sulfa (sulfonamide antibiotics)    Review of Systems  As in history of present illness, otherwise negative  Physical Exam   A&O x3  PERRL EOMI   Lungs diminished breath sounds bilaterally   Heart RRR  Abd soft tender in the lower abdomen  Ext no leg edema   Neuro no focal deficit   Last Recorded Vitals  BP 98/57 (BP Location: Right arm, Patient Position: Sitting)   Pulse 74   Temp 36.8 °C (98.2 °F) (Oral)   Resp 16   Wt 59.4 kg (130 lb 15.3 oz)   SpO2 100%     Relevant Results    CT abdomen pelvis w IV contrast    Result Date: 10/9/2023  Interpreted By:  Dc Jimenez, STUDY: CT ABDOMEN PELVIS W IV CONTRAST; 10/9/2023 5:37 pm   INDICATION: . Bloody diarrhea. History of recent flexible sigmoidoscopy with evidence for bowel inflammation.   COMPARISON: None..   ACCESSION NUMBER(S): [id number]   ORDERING CLINICIAN: DEVONTE PISANO   TECHNIQUE: 75 ml Omnipaque 350 was injected intravenously. CT of the Abdomen and Pelvis without and with intravenous contrast was performed.  Axial, sagittal and coronal reformatted images were reviewed.   FINDINGS: Lower Chest: within normal limits.   Abdomen: Liver: within normal limits. Bile Ducts: Normal caliber. Gallbladder: No calcified gallstones. Normal caliber wall. Pancreas: within normal limits. Spleen: within normal limits. Adrenals: within normal limits. Kidneys: within normal limits.   Pelvis: No pelvic masses. Ureters: within normal limits. Bladder: within normal limits.   Bowel: There is luminal narrowing and marked wall thickening involving the sigmoid and descending colon extending to the level of the splenic flexure Mesenteric Lymph Nodes: No enlarged mesenteric lymph nodes. Peritoneum: No ascites or free air, no fluid collection. Vessels: Unremarkable. Retroperitoneum: within normal limits. Abdominal Wall: within normal limits. Bones: within normal limits.       Findings most consistent with colitis.   MACRO: none   Signed by: Dc Jimenez 10/9/2023 6:24 PM Dictation workstation:   [id number]      Results for orders placed or performed during the hospital encounter of 10/09/23 (from the past 24 hour(s))   CBC with Differential   Result Value Ref Range    WBC 13.6 (H) 4.4 - 11.3 x10*3/uL    nRBC 0.0 0.0 - 0.0 /100 WBCs    RBC 4.59 4.00 - 5.20 x10*6/uL    Hemoglobin 13.9 12.0 - 16.0 g/dL    Hematocrit 41.8 36.0 - 46.0 %    MCV 91 80 - 100 fL    MCH 30.3 26.0 - 34.0 pg    MCHC 33.3 32.0 - 36.0 g/dL    RDW 12.0 11.5 - 14.5 %    Platelets 493 (H) 150 - 450 x10*3/uL    MPV 9.9 7.5 - 11.5 fL    Immature Granulocytes %, Automated 11.6 (H) 0.0 - 0.9 %    Immature Granulocytes Absolute, Automated 1.57 (H) 0.00 - 0.70 x10*3/uL   Comprehensive Metabolic Panel   Result Value Ref Range    Glucose 136 (H) 65 - 99 mg/dL    Sodium 135 133 - 145 mmol/L    Potassium 4.1 3.4 - 5.1 mmol/L    Chloride 99 97 - 107 mmol/L    Bicarbonate 26 24 - 31 mmol/L    Urea Nitrogen 12 8 - 25 mg/dL    Creatinine 0.70 0.40 - 1.60 mg/dL    eGFR >90 >60 mL/min/1.73m*2     Calcium 9.6 8.5 - 10.4 mg/dL    Albumin 3.6 3.5 - 5.0 g/dL    Alkaline Phosphatase 81 35 - 125 U/L    Total Protein 6.9 5.9 - 7.9 g/dL    AST 29 5 - 40 U/L    Bilirubin, Total 0.3 0.1 - 1.2 mg/dL    ALT 24 5 - 40 U/L    Anion Gap 10 <=19 mmol/L   Creatinine, Serum   Result Value Ref Range    Creatinine 0.70 0.40 - 1.60 mg/dL    eGFR >90 >60 mL/min/1.73m*2   Lipase   Result Value Ref Range    Lipase 33 16 - 63 U/L   Sedimentation rate, automated   Result Value Ref Range    Sedimentation Rate 40 (H) 0 - 20 mm/h   C-reactive protein   Result Value Ref Range    C-Reactive Protein 4.50 (H) 0.00 - 2.00 mg/dL   Manual Differential   Result Value Ref Range    Neutrophils %, Manual 58.0 40.0 - 80.0 %    Bands %, Manual 27.0 0.0 - 5.0 %    Lymphocytes %, Manual 0.0 13.0 - 44.0 %    Monocytes %, Manual 2.0 2.0 - 10.0 %    Eosinophils %, Manual 0.0 0.0 - 6.0 %    Basophils %, Manual 0.0 0.0 - 2.0 %    Metamyelocytes %, Manual 5.0 0.0 - 0.0 %    Myelocytes %, Manual 8.0 0.0 - 0.0 %    Seg Neutrophils Absolute, Manual 7.89 (H) 1.20 - 7.00 x10*3/uL    Bands Absolute, Manual 3.67 (H) 0.00 - 0.70 x10*3/uL    Lymphocytes Absolute, Manual 0.00 (L) 1.20 - 4.80 x10*3/uL    Monocytes Absolute, Manual 0.27 0.10 - 1.00 x10*3/uL    Eosinophils Absolute, Manual 0.00 0.00 - 0.70 x10*3/uL    Basophils Absolute, Manual 0.00 0.00 - 0.10 x10*3/uL    Metamyelocytes Absolute, Manual 0.68 0.00 - 0.00 x10*3/uL    Myelocytes Absolute, Manual 1.09 0.00 - 0.00 x10*3/uL    Total Cells Counted 100     Neutrophils Absolute, Manual 11.56 (H) 1.20 - 7.70 x10*3/uL    RBC Morphology No significant RBC morphology present    Urinalysis with Reflex Microscopic and Culture   Result Value Ref Range    Color, Urine Yellow Light-Yellow, Yellow, Dark-Yellow    Appearance, Urine Turbid (N) Clear    Specific Gravity, Urine 1.026 1.005 - 1.035    pH, Urine 7.0 5.0, 5.5, 6.0, 6.5, 7.0, 7.5, 8.0    Protein, Urine NEGATIVE NEGATIVE, 10 (TRACE), 20 (TRACE) mg/dL     Glucose, Urine Normal Normal mg/dL    Blood, Urine NEGATIVE NEGATIVE    Ketones, Urine NEGATIVE NEGATIVE mg/dL    Bilirubin, Urine NEGATIVE NEGATIVE    Urobilinogen, Urine Normal Normal mg/dL    Nitrite, Urine NEGATIVE NEGATIVE    Leukocyte Esterase, Urine NEGATIVE NEGATIVE   BLOOD GAS LACTIC ACID, VENOUS   Result Value Ref Range    POCT Lactate, Venous 1.2 0.4 - 2.0 mmol/L        Assessment/Plan   Principal Problem:    Inflammatory bowel disease      Ulcerative Colitis Flare    Plan:  IV hydration  We will keep the patient on clear liquid diet  Start Solu-Medrol 20 mg IV every 8 hours  Consult GI  Antiemetics and antidiarrheal medications as needed  Check stool C. difficile and stool culture  DVT prophylaxis       John Buckley MD

## 2023-10-11 LAB
ANION GAP SERPL CALC-SCNC: 10 MMOL/L
BUN SERPL-MCNC: 6 MG/DL (ref 8–25)
CALCIUM SERPL-MCNC: 8.8 MG/DL (ref 8.5–10.4)
CHLORIDE SERPL-SCNC: 106 MMOL/L (ref 97–107)
CO2 SERPL-SCNC: 24 MMOL/L (ref 24–31)
CREAT SERPL-MCNC: 0.6 MG/DL (ref 0.4–1.6)
ERYTHROCYTE [DISTWIDTH] IN BLOOD BY AUTOMATED COUNT: 12.1 % (ref 11.5–14.5)
GFR SERPL CREATININE-BSD FRML MDRD: >90 ML/MIN/1.73M*2
GLUCOSE SERPL-MCNC: 135 MG/DL (ref 65–99)
HCT VFR BLD AUTO: 36.1 % (ref 36–46)
HGB BLD-MCNC: 11.6 G/DL (ref 12–16)
MCH RBC QN AUTO: 29.9 PG (ref 26–34)
MCHC RBC AUTO-ENTMCNC: 32.1 G/DL (ref 32–36)
MCV RBC AUTO: 93 FL (ref 80–100)
NRBC BLD-RTO: 0 /100 WBCS (ref 0–0)
PLATELET # BLD AUTO: 456 X10*3/UL (ref 150–450)
PMV BLD AUTO: 9.9 FL (ref 7.5–11.5)
POTASSIUM SERPL-SCNC: 4 MMOL/L (ref 3.4–5.1)
RBC # BLD AUTO: 3.88 X10*6/UL (ref 4–5.2)
SODIUM SERPL-SCNC: 140 MMOL/L (ref 133–145)
WBC # BLD AUTO: 15.8 X10*3/UL (ref 4.4–11.3)

## 2023-10-11 PROCEDURE — 2500000001 HC RX 250 WO HCPCS SELF ADMINISTERED DRUGS (ALT 637 FOR MEDICARE OP): Performed by: FAMILY MEDICINE

## 2023-10-11 PROCEDURE — 36415 COLL VENOUS BLD VENIPUNCTURE: CPT | Performed by: NURSE PRACTITIONER

## 2023-10-11 PROCEDURE — 82374 ASSAY BLOOD CARBON DIOXIDE: CPT | Performed by: NURSE PRACTITIONER

## 2023-10-11 PROCEDURE — 2500000004 HC RX 250 GENERAL PHARMACY W/ HCPCS (ALT 636 FOR OP/ED)

## 2023-10-11 PROCEDURE — 2500000001 HC RX 250 WO HCPCS SELF ADMINISTERED DRUGS (ALT 637 FOR MEDICARE OP): Performed by: NURSE PRACTITIONER

## 2023-10-11 PROCEDURE — 80048 BASIC METABOLIC PNL TOTAL CA: CPT | Performed by: NURSE PRACTITIONER

## 2023-10-11 PROCEDURE — 2500000004 HC RX 250 GENERAL PHARMACY W/ HCPCS (ALT 636 FOR OP/ED): Performed by: FAMILY MEDICINE

## 2023-10-11 PROCEDURE — 1100000001 HC PRIVATE ROOM DAILY

## 2023-10-11 PROCEDURE — 85027 COMPLETE CBC AUTOMATED: CPT | Performed by: NURSE PRACTITIONER

## 2023-10-11 PROCEDURE — 2500000004 HC RX 250 GENERAL PHARMACY W/ HCPCS (ALT 636 FOR OP/ED): Performed by: NURSE PRACTITIONER

## 2023-10-11 RX ADMIN — METHYLPREDNISOLONE SODIUM SUCCINATE 20 MG: 40 INJECTION INTRAMUSCULAR; INTRAVENOUS at 14:54

## 2023-10-11 RX ADMIN — MORPHINE SULFATE 1 MG: 2 INJECTION, SOLUTION INTRAMUSCULAR; INTRAVENOUS at 23:12

## 2023-10-11 RX ADMIN — METHYLPREDNISOLONE SODIUM SUCCINATE 20 MG: 40 INJECTION INTRAMUSCULAR; INTRAVENOUS at 23:04

## 2023-10-11 RX ADMIN — METHYLPREDNISOLONE SODIUM SUCCINATE 20 MG: 40 INJECTION INTRAMUSCULAR; INTRAVENOUS at 06:12

## 2023-10-11 RX ADMIN — OXYCODONE AND ACETAMINOPHEN 1 TABLET: 5; 325 TABLET ORAL at 10:05

## 2023-10-11 RX ADMIN — LOPERAMIDE HYDROCHLORIDE 2 MG: 2 CAPSULE ORAL at 14:58

## 2023-10-11 RX ADMIN — ONDANSETRON 4 MG: 2 INJECTION INTRAMUSCULAR; INTRAVENOUS at 17:40

## 2023-10-11 RX ADMIN — ONDANSETRON 4 MG: 2 INJECTION INTRAMUSCULAR; INTRAVENOUS at 10:05

## 2023-10-11 RX ADMIN — PANTOPRAZOLE SODIUM 40 MG: 40 TABLET, DELAYED RELEASE ORAL at 06:11

## 2023-10-11 RX ADMIN — OXYCODONE AND ACETAMINOPHEN 1 TABLET: 5; 325 TABLET ORAL at 17:40

## 2023-10-11 ASSESSMENT — COGNITIVE AND FUNCTIONAL STATUS - GENERAL
DAILY ACTIVITIY SCORE: 24
MOBILITY SCORE: 24
MOBILITY SCORE: 24
DAILY ACTIVITIY SCORE: 24

## 2023-10-11 ASSESSMENT — PAIN SCALES - GENERAL
PAINLEVEL_OUTOF10: 3
PAINLEVEL_OUTOF10: 3
PAINLEVEL_OUTOF10: 5 - MODERATE PAIN
PAINLEVEL_OUTOF10: 0 - NO PAIN
PAINLEVEL_OUTOF10: 6
PAINLEVEL_OUTOF10: 6
PAINLEVEL_OUTOF10: 8
PAINLEVEL_OUTOF10: 3

## 2023-10-11 ASSESSMENT — PAIN SCALES - WONG BAKER
WONGBAKER_NUMERICALRESPONSE: HURTS LITTLE BIT
WONGBAKER_NUMERICALRESPONSE: NO HURT

## 2023-10-11 ASSESSMENT — PAIN - FUNCTIONAL ASSESSMENT
PAIN_FUNCTIONAL_ASSESSMENT: PAINAD (PAIN ASSESSMENT IN ADVANCED DEMENTIA SCALE)
PAIN_FUNCTIONAL_ASSESSMENT: PAINAD (PAIN ASSESSMENT IN ADVANCED DEMENTIA SCALE)

## 2023-10-11 NOTE — PROGRESS NOTES
Diana De La Fuente is a 43 y.o. female on day 2 of admission presenting with Inflammatory bowel disease.      Subjective   Pt states diarrhea is improved however still bloody. Abd pain is slightly improved.        Objective     Last Recorded Vitals  /74 (BP Location: Right arm, Patient Position: Lying)   Pulse 62   Temp 36.6 °C (97.9 °F) (Temporal)   Resp 17   Wt 59.4 kg (130 lb 15.3 oz)   SpO2 98%   Intake/Output last 3 Shifts:    Intake/Output Summary (Last 24 hours) at 10/11/2023 1033  Last data filed at 10/11/2023 0851  Gross per 24 hour   Intake 1050 ml   Output 1260 ml   Net -210 ml       Results for orders placed or performed during the hospital encounter of 10/09/23 (from the past 24 hour(s))   hCG, Urine, Qualitative   Result Value Ref Range    HCG, Urine NEGATIVE NEGATIVE   Basic metabolic panel   Result Value Ref Range    Glucose 135 (H) 65 - 99 mg/dL    Sodium 140 133 - 145 mmol/L    Potassium 4.0 3.4 - 5.1 mmol/L    Chloride 106 97 - 107 mmol/L    Bicarbonate 24 24 - 31 mmol/L    Urea Nitrogen 6 (L) 8 - 25 mg/dL    Creatinine 0.60 0.40 - 1.60 mg/dL    eGFR >90 >60 mL/min/1.73m*2    Calcium 8.8 8.5 - 10.4 mg/dL    Anion Gap 10 <=19 mmol/L   CBC   Result Value Ref Range    WBC 15.8 (H) 4.4 - 11.3 x10*3/uL    nRBC 0.0 0.0 - 0.0 /100 WBCs    RBC 3.88 (L) 4.00 - 5.20 x10*6/uL    Hemoglobin 11.6 (L) 12.0 - 16.0 g/dL    Hematocrit 36.1 36.0 - 46.0 %    MCV 93 80 - 100 fL    MCH 29.9 26.0 - 34.0 pg    MCHC 32.1 32.0 - 36.0 g/dL    RDW 12.1 11.5 - 14.5 %    Platelets 456 (H) 150 - 450 x10*3/uL    MPV 9.9 7.5 - 11.5 fL           Physical Exam  Constitutional:       Appearance: Normal appearance. She is normal weight.   HENT:      Head: Normocephalic and atraumatic.      Nose: Nose normal.      Mouth/Throat:      Mouth: Mucous membranes are moist.      Pharynx: Oropharynx is clear.   Eyes:      Extraocular Movements: Extraocular movements intact.      Conjunctiva/sclera: Conjunctivae normal.      Pupils:  Pupils are equal, round, and reactive to light.   Cardiovascular:      Rate and Rhythm: Normal rate and regular rhythm.      Pulses: Normal pulses.   Pulmonary:      Effort: Pulmonary effort is normal.      Breath sounds: Normal breath sounds.   Abdominal:      General: Abdomen is flat. Bowel sounds are normal.      Palpations: Abdomen is soft.      Tenderness: There is abdominal tenderness.   Musculoskeletal:         General: Normal range of motion.      Cervical back: Normal range of motion and neck supple.   Skin:     General: Skin is warm and dry.      Capillary Refill: Capillary refill takes less than 2 seconds.   Neurological:      General: No focal deficit present.      Mental Status: She is alert and oriented to person, place, and time.   Psychiatric:         Mood and Affect: Mood normal.           Assessment/Plan      Ulcerative Colitis  -Continue IV steroids  -Will stop IV fluids  -GI follows, considering Remicade  -Tolerating clear liquids, advance per GI  -Pain control              COLBY Shukla-CNP

## 2023-10-11 NOTE — PROGRESS NOTES
"Diana De La Fuente is a 43 y.o. female on day 2 of admission presenting with Inflammatory bowel disease.    Subjective   6 bowel movements since midnight. This is improved from yesterday. Some blood. No NV        Objective     Physical Exam  Constitutional:       Appearance: Normal appearance.   HENT:      Head: Normocephalic and atraumatic.      Mouth/Throat:      Mouth: Mucous membranes are moist.   Cardiovascular:      Rate and Rhythm: Normal rate.   Abdominal:      General: Abdomen is flat. There is distension.      Palpations: Abdomen is soft.      Tenderness: There is abdominal tenderness. There is no guarding.   Musculoskeletal:         General: Normal range of motion.      Cervical back: Normal range of motion.   Skin:     General: Skin is warm.   Neurological:      General: No focal deficit present.      Mental Status: She is alert. Mental status is at baseline.   Psychiatric:         Mood and Affect: Mood normal.         Last Recorded Vitals  Blood pressure 109/74, pulse 62, temperature 36.6 °C (97.9 °F), temperature source Temporal, resp. rate 17, height 1.575 m (5' 2.01\"), weight 59.4 kg (130 lb 15.3 oz), SpO2 98 %.  Intake/Output last 3 Shifts:  I/O last 3 completed shifts:  In: 4620.8 (77.8 mL/kg) [P.O.:1050; I.V.:2570.8 (43.3 mL/kg); IV Piggyback:1000]  Out: 1260 (21.2 mL/kg) [Urine:1260 (0.6 mL/kg/hr)]  Weight: 59.4 kg     Relevant Results                Results for orders placed or performed during the hospital encounter of 10/09/23 (from the past 24 hour(s))   hCG, Urine, Qualitative   Result Value Ref Range    HCG, Urine NEGATIVE NEGATIVE   Basic metabolic panel   Result Value Ref Range    Glucose 135 (H) 65 - 99 mg/dL    Sodium 140 133 - 145 mmol/L    Potassium 4.0 3.4 - 5.1 mmol/L    Chloride 106 97 - 107 mmol/L    Bicarbonate 24 24 - 31 mmol/L    Urea Nitrogen 6 (L) 8 - 25 mg/dL    Creatinine 0.60 0.40 - 1.60 mg/dL    eGFR >90 >60 mL/min/1.73m*2    Calcium 8.8 8.5 - 10.4 mg/dL    Anion Gap 10 <=19 " mmol/L   CBC   Result Value Ref Range    WBC 15.8 (H) 4.4 - 11.3 x10*3/uL    nRBC 0.0 0.0 - 0.0 /100 WBCs    RBC 3.88 (L) 4.00 - 5.20 x10*6/uL    Hemoglobin 11.6 (L) 12.0 - 16.0 g/dL    Hematocrit 36.1 36.0 - 46.0 %    MCV 93 80 - 100 fL    MCH 29.9 26.0 - 34.0 pg    MCHC 32.1 32.0 - 36.0 g/dL    RDW 12.1 11.5 - 14.5 %    Platelets 456 (H) 150 - 450 x10*3/uL    MPV 9.9 7.5 - 11.5 fL     CT abdomen pelvis w IV contrast    Result Date: 10/9/2023  Interpreted By:  Dc Jimenez, STUDY: CT ABDOMEN PELVIS W IV CONTRAST; 10/9/2023 5:37 pm   INDICATION: . Bloody diarrhea. History of recent flexible sigmoidoscopy with evidence for bowel inflammation.   COMPARISON: None..   ACCESSION NUMBER(S): [id number]   ORDERING CLINICIAN: DEVONTE PISANO   TECHNIQUE: 75 ml Omnipaque 350 was injected intravenously. CT of the Abdomen and Pelvis without and with intravenous contrast was performed. Axial, sagittal and coronal reformatted images were reviewed.   FINDINGS: Lower Chest: within normal limits.   Abdomen: Liver: within normal limits. Bile Ducts: Normal caliber. Gallbladder: No calcified gallstones. Normal caliber wall. Pancreas: within normal limits. Spleen: within normal limits. Adrenals: within normal limits. Kidneys: within normal limits.   Pelvis: No pelvic masses. Ureters: within normal limits. Bladder: within normal limits.   Bowel: There is luminal narrowing and marked wall thickening involving the sigmoid and descending colon extending to the level of the splenic flexure Mesenteric Lymph Nodes: No enlarged mesenteric lymph nodes. Peritoneum: No ascites or free air, no fluid collection. Vessels: Unremarkable. Retroperitoneum: within normal limits. Abdominal Wall: within normal limits. Bones: within normal limits.       Findings most consistent with colitis.   MACRO: none   Signed by: Dc Jimenez 10/9/2023 6:24 PM Dictation workstation:   [id number]                Assessment/Plan   Principal Problem:     Inflammatory bowel disease    UC Flare (CRP 5)     -Continue IV fluids  -Soft low fiber diet   -Stool studies 10/2 negative     If symptoms persist, may consider starting patient on Remicade. Already improving. Will continue IV steriods today    2. Leukocytosis (WBC 15)    -Possible related to actually IBD vs steriods but need to r/o infection. Stool studies pending          I spent 20 minutes in the professional and overall care of this patient.      Jo-Ann Doran, COLBY-CNP

## 2023-10-11 NOTE — CARE PLAN
The patient's goals for the shift include      The clinical goals for the shift include manage bms    Over the shift, the patient did not make progress toward the following goals. Barriers to progression include . Recommendations to address these barriers include .

## 2023-10-12 LAB
ANION GAP SERPL CALC-SCNC: 9 MMOL/L
BUN SERPL-MCNC: 8 MG/DL (ref 8–25)
CALCIUM SERPL-MCNC: 8.6 MG/DL (ref 8.5–10.4)
CHLORIDE SERPL-SCNC: 104 MMOL/L (ref 97–107)
CO2 SERPL-SCNC: 25 MMOL/L (ref 24–31)
CREAT SERPL-MCNC: 0.6 MG/DL (ref 0.4–1.6)
ERYTHROCYTE [DISTWIDTH] IN BLOOD BY AUTOMATED COUNT: 12 % (ref 11.5–14.5)
GFR SERPL CREATININE-BSD FRML MDRD: >90 ML/MIN/1.73M*2
GLUCOSE SERPL-MCNC: 127 MG/DL (ref 65–99)
HCT VFR BLD AUTO: 34.2 % (ref 36–46)
HGB BLD-MCNC: 11.1 G/DL (ref 12–16)
MCH RBC QN AUTO: 29.8 PG (ref 26–34)
MCHC RBC AUTO-ENTMCNC: 32.5 G/DL (ref 32–36)
MCV RBC AUTO: 92 FL (ref 80–100)
NRBC BLD-RTO: 0 /100 WBCS (ref 0–0)
PLATELET # BLD AUTO: 407 X10*3/UL (ref 150–450)
PMV BLD AUTO: 9.8 FL (ref 7.5–11.5)
POTASSIUM SERPL-SCNC: 4.2 MMOL/L (ref 3.4–5.1)
RBC # BLD AUTO: 3.73 X10*6/UL (ref 4–5.2)
SODIUM SERPL-SCNC: 138 MMOL/L (ref 133–145)
WBC # BLD AUTO: 14.1 X10*3/UL (ref 4.4–11.3)

## 2023-10-12 PROCEDURE — 2500000004 HC RX 250 GENERAL PHARMACY W/ HCPCS (ALT 636 FOR OP/ED): Performed by: NURSE PRACTITIONER

## 2023-10-12 PROCEDURE — 85027 COMPLETE CBC AUTOMATED: CPT | Performed by: NURSE PRACTITIONER

## 2023-10-12 PROCEDURE — 2500000004 HC RX 250 GENERAL PHARMACY W/ HCPCS (ALT 636 FOR OP/ED): Performed by: FAMILY MEDICINE

## 2023-10-12 PROCEDURE — 36415 COLL VENOUS BLD VENIPUNCTURE: CPT | Performed by: NURSE PRACTITIONER

## 2023-10-12 PROCEDURE — 2500000001 HC RX 250 WO HCPCS SELF ADMINISTERED DRUGS (ALT 637 FOR MEDICARE OP): Performed by: INTERNAL MEDICINE

## 2023-10-12 PROCEDURE — 2500000001 HC RX 250 WO HCPCS SELF ADMINISTERED DRUGS (ALT 637 FOR MEDICARE OP): Performed by: NURSE PRACTITIONER

## 2023-10-12 PROCEDURE — 1100000001 HC PRIVATE ROOM DAILY

## 2023-10-12 PROCEDURE — 80048 BASIC METABOLIC PNL TOTAL CA: CPT | Performed by: NURSE PRACTITIONER

## 2023-10-12 RX ORDER — TALC
6 POWDER (GRAM) TOPICAL NIGHTLY PRN
Status: DISCONTINUED | OUTPATIENT
Start: 2023-10-12 | End: 2023-10-14 | Stop reason: HOSPADM

## 2023-10-12 RX ORDER — MESALAMINE 4 G/60ML
4 SUSPENSION RECTAL 2 TIMES DAILY
Status: DISCONTINUED | OUTPATIENT
Start: 2023-10-12 | End: 2023-10-14 | Stop reason: HOSPADM

## 2023-10-12 RX ADMIN — MESALAMINE 4 G: 4 ENEMA RECTAL at 10:36

## 2023-10-12 RX ADMIN — ONDANSETRON 4 MG: 2 INJECTION INTRAMUSCULAR; INTRAVENOUS at 20:04

## 2023-10-12 RX ADMIN — MESALAMINE 4 G: 4 ENEMA RECTAL at 20:05

## 2023-10-12 RX ADMIN — OXYCODONE AND ACETAMINOPHEN 1 TABLET: 5; 325 TABLET ORAL at 20:04

## 2023-10-12 RX ADMIN — PANTOPRAZOLE SODIUM 40 MG: 40 TABLET, DELAYED RELEASE ORAL at 06:44

## 2023-10-12 RX ADMIN — ONDANSETRON 4 MG: 2 INJECTION INTRAMUSCULAR; INTRAVENOUS at 08:08

## 2023-10-12 RX ADMIN — Medication 6 MG: at 23:03

## 2023-10-12 RX ADMIN — METHYLPREDNISOLONE SODIUM SUCCINATE 20 MG: 40 INJECTION INTRAMUSCULAR; INTRAVENOUS at 06:43

## 2023-10-12 RX ADMIN — METHYLPREDNISOLONE SODIUM SUCCINATE 20 MG: 40 INJECTION INTRAMUSCULAR; INTRAVENOUS at 15:25

## 2023-10-12 RX ADMIN — OXYCODONE AND ACETAMINOPHEN 1 TABLET: 5; 325 TABLET ORAL at 08:08

## 2023-10-12 RX ADMIN — METHYLPREDNISOLONE SODIUM SUCCINATE 20 MG: 40 INJECTION INTRAMUSCULAR; INTRAVENOUS at 23:03

## 2023-10-12 ASSESSMENT — COGNITIVE AND FUNCTIONAL STATUS - GENERAL
DAILY ACTIVITIY SCORE: 24
MOBILITY SCORE: 24
DAILY ACTIVITIY SCORE: 24
MOBILITY SCORE: 24

## 2023-10-12 ASSESSMENT — PAIN SCALES - GENERAL
PAINLEVEL_OUTOF10: 6
PAINLEVEL_OUTOF10: 0 - NO PAIN
PAINLEVEL_OUTOF10: 6
PAINLEVEL_OUTOF10: 2

## 2023-10-12 ASSESSMENT — PAIN - FUNCTIONAL ASSESSMENT
PAIN_FUNCTIONAL_ASSESSMENT: 0-10
PAIN_FUNCTIONAL_ASSESSMENT: 0-10

## 2023-10-12 NOTE — PROGRESS NOTES
Pt will dc home when medically cleared with no anticipated skilled needs. TCC will continue to follow.     Nilda NARVAEZA, LSW

## 2023-10-12 NOTE — PROGRESS NOTES
"Diana De La Fuente is a 43 y.o. female on day 3 of admission presenting with Inflammatory bowel disease.    Subjective   She reports increased frequency and urgency over the past 24 hours. Still with bleeding as well        Objective     Physical Exam  Constitutional:       Appearance: Normal appearance.   HENT:      Head: Normocephalic and atraumatic.      Mouth/Throat:      Mouth: Mucous membranes are moist.   Cardiovascular:      Rate and Rhythm: Normal rate.   Pulmonary:      Effort: Pulmonary effort is normal.   Abdominal:      General: There is distension.      Palpations: Abdomen is soft.      Tenderness: There is abdominal tenderness. There is no guarding.   Musculoskeletal:         General: Normal range of motion.      Cervical back: Normal range of motion.   Skin:     General: Skin is warm.   Neurological:      General: No focal deficit present.      Mental Status: She is alert. Mental status is at baseline.         Last Recorded Vitals  Blood pressure 114/64, pulse 66, temperature 36.6 °C (97.9 °F), temperature source Temporal, resp. rate 18, height 1.575 m (5' 2.01\"), weight 59.4 kg (130 lb 15.3 oz), SpO2 97 %.  Intake/Output last 3 Shifts:  I/O last 3 completed shifts:  In: 900 (15.2 mL/kg) [P.O.:900]  Out: 2400 (40.4 mL/kg) [Urine:2400 (1.1 mL/kg/hr)]  Weight: 59.4 kg     Relevant Results                Results for orders placed or performed during the hospital encounter of 10/09/23 (from the past 24 hour(s))   CBC   Result Value Ref Range    WBC 14.1 (H) 4.4 - 11.3 x10*3/uL    nRBC 0.0 0.0 - 0.0 /100 WBCs    RBC 3.73 (L) 4.00 - 5.20 x10*6/uL    Hemoglobin 11.1 (L) 12.0 - 16.0 g/dL    Hematocrit 34.2 (L) 36.0 - 46.0 %    MCV 92 80 - 100 fL    MCH 29.8 26.0 - 34.0 pg    MCHC 32.5 32.0 - 36.0 g/dL    RDW 12.0 11.5 - 14.5 %    Platelets 407 150 - 450 x10*3/uL    MPV 9.8 7.5 - 11.5 fL   Basic metabolic panel   Result Value Ref Range    Glucose 127 (H) 65 - 99 mg/dL    Sodium 138 133 - 145 mmol/L    Potassium " 4.2 3.4 - 5.1 mmol/L    Chloride 104 97 - 107 mmol/L    Bicarbonate 25 24 - 31 mmol/L    Urea Nitrogen 8 8 - 25 mg/dL    Creatinine 0.60 0.40 - 1.60 mg/dL    eGFR >90 >60 mL/min/1.73m*2    Calcium 8.6 8.5 - 10.4 mg/dL    Anion Gap 9 <=19 mmol/L     CT abdomen pelvis w IV contrast    Result Date: 10/9/2023  Interpreted By:  Dc Jimenez, STUDY: CT ABDOMEN PELVIS W IV CONTRAST; 10/9/2023 5:37 pm   INDICATION: . Bloody diarrhea. History of recent flexible sigmoidoscopy with evidence for bowel inflammation.   COMPARISON: None..   ACCESSION NUMBER(S): [id number]   ORDERING CLINICIAN: DEVONTE PISANO   TECHNIQUE: 75 ml Omnipaque 350 was injected intravenously. CT of the Abdomen and Pelvis without and with intravenous contrast was performed. Axial, sagittal and coronal reformatted images were reviewed.   FINDINGS: Lower Chest: within normal limits.   Abdomen: Liver: within normal limits. Bile Ducts: Normal caliber. Gallbladder: No calcified gallstones. Normal caliber wall. Pancreas: within normal limits. Spleen: within normal limits. Adrenals: within normal limits. Kidneys: within normal limits.   Pelvis: No pelvic masses. Ureters: within normal limits. Bladder: within normal limits.   Bowel: There is luminal narrowing and marked wall thickening involving the sigmoid and descending colon extending to the level of the splenic flexure Mesenteric Lymph Nodes: No enlarged mesenteric lymph nodes. Peritoneum: No ascites or free air, no fluid collection. Vessels: Unremarkable. Retroperitoneum: within normal limits. Abdominal Wall: within normal limits. Bones: within normal limits.       Findings most consistent with colitis.   MACRO: none   Signed by: Dc Jimenez 10/9/2023 6:24 PM Dictation workstation:   [id number]                Assessment/Plan   Principal Problem:    Inflammatory bowel disease      Inflammatory bowel disease     UC Flare (CRP 5)      -Continue IV fluids  -Soft low fiber diet   -Stool studies 10/2  negative      If symptoms persist, may consider starting patient on Remicade (would then need to transfer to [facility]) Already improving. Will continue IV steriods today    -Add Rowasa enemas BID per Dr Cason      2. Leukocytosis (WBC 15)               -Possible related to actual IBD vs steriods but need to r/o infection       I spent 20 minutes in the professional and overall care of this patient.      Jo-Ann Doran, APRN-CNP

## 2023-10-12 NOTE — CARE PLAN
The patient's goals for the shift include  PAIN CONTROL    The clinical goals for the shift include pain control

## 2023-10-12 NOTE — PROGRESS NOTES
"Diana De La Fuente is a 43 y.o. female on day 3 of admission presenting with Inflammatory bowel disease.      Subjective   Pt still with bloody diarrhea. States frequency is \"waxing and waning\". Diet advanced yesterday and she is tolerating without increased abd pain. Afebrile.        Objective     Last Recorded Vitals  /64 (BP Location: Right arm, Patient Position: Lying)   Pulse 66   Temp 36.6 °C (97.9 °F) (Temporal)   Resp 18   Wt 59.4 kg (130 lb 15.3 oz)   SpO2 97%   Intake/Output last 3 Shifts:    Intake/Output Summary (Last 24 hours) at 10/12/2023 0824  Last data filed at 10/12/2023 0600  Gross per 24 hour   Intake 900 ml   Output 1800 ml   Net -900 ml         Physical Exam  HENT:      Head: Normocephalic and atraumatic.      Nose: Nose normal.      Mouth/Throat:      Mouth: Mucous membranes are moist.      Pharynx: Oropharynx is clear.   Eyes:      Extraocular Movements: Extraocular movements intact.      Pupils: Pupils are equal, round, and reactive to light.   Cardiovascular:      Rate and Rhythm: Normal rate and regular rhythm.      Pulses: Normal pulses.   Pulmonary:      Effort: Pulmonary effort is normal.      Breath sounds: Normal breath sounds.   Abdominal:      General: Abdomen is flat. Bowel sounds are normal.      Palpations: Abdomen is soft.      Tenderness: There is abdominal tenderness.      Comments: Abd tenderness with palpation   Musculoskeletal:         General: Normal range of motion.   Skin:     General: Skin is warm and dry.      Capillary Refill: Capillary refill takes less than 2 seconds.   Neurological:      General: No focal deficit present.      Mental Status: She is oriented to person, place, and time.   Psychiatric:         Mood and Affect: Mood normal.         Relevant Results  Results for orders placed or performed during the hospital encounter of 10/09/23 (from the past 24 hour(s))   CBC   Result Value Ref Range    WBC 14.1 (H) 4.4 - 11.3 x10*3/uL    nRBC 0.0 0.0 - 0.0 " /100 WBCs    RBC 3.73 (L) 4.00 - 5.20 x10*6/uL    Hemoglobin 11.1 (L) 12.0 - 16.0 g/dL    Hematocrit 34.2 (L) 36.0 - 46.0 %    MCV 92 80 - 100 fL    MCH 29.8 26.0 - 34.0 pg    MCHC 32.5 32.0 - 36.0 g/dL    RDW 12.0 11.5 - 14.5 %    Platelets 407 150 - 450 x10*3/uL    MPV 9.8 7.5 - 11.5 fL   Basic metabolic panel   Result Value Ref Range    Glucose 127 (H) 65 - 99 mg/dL    Sodium 138 133 - 145 mmol/L    Potassium 4.2 3.4 - 5.1 mmol/L    Chloride 104 97 - 107 mmol/L    Bicarbonate 25 24 - 31 mmol/L    Urea Nitrogen 8 8 - 25 mg/dL    Creatinine 0.60 0.40 - 1.60 mg/dL    eGFR >90 >60 mL/min/1.73m*2    Calcium 8.6 8.5 - 10.4 mg/dL    Anion Gap 9 <=19 mmol/L         Assessment/Plan      Ulcerative Colitis  -Continue IV steroids  -GI follows, considering Remicade  -Tolerating low fiber diet  -Pain control  -Monitor Hgb                Bindu Morley, APRN-CNP

## 2023-10-12 NOTE — CARE PLAN
The patient's goals for the shift include  pain management    The clinical goals for the shift include pain control

## 2023-10-13 LAB
ANION GAP SERPL CALC-SCNC: 10 MMOL/L
BUN SERPL-MCNC: 12 MG/DL (ref 8–25)
CALCIUM SERPL-MCNC: 8.8 MG/DL (ref 8.5–10.4)
CHLORIDE SERPL-SCNC: 100 MMOL/L (ref 97–107)
CO2 SERPL-SCNC: 27 MMOL/L (ref 24–31)
CREAT SERPL-MCNC: 0.6 MG/DL (ref 0.4–1.6)
ERYTHROCYTE [DISTWIDTH] IN BLOOD BY AUTOMATED COUNT: 12 % (ref 11.5–14.5)
GFR SERPL CREATININE-BSD FRML MDRD: >90 ML/MIN/1.73M*2
GLUCOSE SERPL-MCNC: 122 MG/DL (ref 65–99)
HCT VFR BLD AUTO: 38 % (ref 36–46)
HGB BLD-MCNC: 12.4 G/DL (ref 12–16)
MCH RBC QN AUTO: 30 PG (ref 26–34)
MCHC RBC AUTO-ENTMCNC: 32.6 G/DL (ref 32–36)
MCV RBC AUTO: 92 FL (ref 80–100)
NRBC BLD-RTO: 0 /100 WBCS (ref 0–0)
O+P STL MICRO: NEGATIVE
PLATELET # BLD AUTO: 465 X10*3/UL (ref 150–450)
PMV BLD AUTO: 9.8 FL (ref 7.5–11.5)
POTASSIUM SERPL-SCNC: 5 MMOL/L (ref 3.4–5.1)
RBC # BLD AUTO: 4.13 X10*6/UL (ref 4–5.2)
SODIUM SERPL-SCNC: 137 MMOL/L (ref 133–145)
WBC # BLD AUTO: 20.3 X10*3/UL (ref 4.4–11.3)

## 2023-10-13 PROCEDURE — 85027 COMPLETE CBC AUTOMATED: CPT | Performed by: NURSE PRACTITIONER

## 2023-10-13 PROCEDURE — 1100000001 HC PRIVATE ROOM DAILY

## 2023-10-13 PROCEDURE — 2500000004 HC RX 250 GENERAL PHARMACY W/ HCPCS (ALT 636 FOR OP/ED): Performed by: NURSE PRACTITIONER

## 2023-10-13 PROCEDURE — 36415 COLL VENOUS BLD VENIPUNCTURE: CPT | Performed by: NURSE PRACTITIONER

## 2023-10-13 PROCEDURE — 2500000001 HC RX 250 WO HCPCS SELF ADMINISTERED DRUGS (ALT 637 FOR MEDICARE OP): Performed by: INTERNAL MEDICINE

## 2023-10-13 PROCEDURE — 82374 ASSAY BLOOD CARBON DIOXIDE: CPT | Performed by: NURSE PRACTITIONER

## 2023-10-13 PROCEDURE — 2500000004 HC RX 250 GENERAL PHARMACY W/ HCPCS (ALT 636 FOR OP/ED): Performed by: FAMILY MEDICINE

## 2023-10-13 PROCEDURE — 2500000001 HC RX 250 WO HCPCS SELF ADMINISTERED DRUGS (ALT 637 FOR MEDICARE OP): Performed by: NURSE PRACTITIONER

## 2023-10-13 RX ADMIN — METHYLPREDNISOLONE SODIUM SUCCINATE 20 MG: 40 INJECTION INTRAMUSCULAR; INTRAVENOUS at 06:25

## 2023-10-13 RX ADMIN — METHYLPREDNISOLONE SODIUM SUCCINATE 20 MG: 40 INJECTION INTRAMUSCULAR; INTRAVENOUS at 15:18

## 2023-10-13 RX ADMIN — OXYCODONE AND ACETAMINOPHEN 1 TABLET: 5; 325 TABLET ORAL at 11:16

## 2023-10-13 RX ADMIN — MESALAMINE 4 G: 4 ENEMA RECTAL at 08:39

## 2023-10-13 RX ADMIN — MESALAMINE 4 G: 4 ENEMA RECTAL at 20:58

## 2023-10-13 RX ADMIN — ONDANSETRON 4 MG: 2 INJECTION INTRAMUSCULAR; INTRAVENOUS at 20:59

## 2023-10-13 RX ADMIN — ONDANSETRON 4 MG: 2 INJECTION INTRAMUSCULAR; INTRAVENOUS at 11:16

## 2023-10-13 RX ADMIN — PANTOPRAZOLE SODIUM 40 MG: 40 TABLET, DELAYED RELEASE ORAL at 06:24

## 2023-10-13 RX ADMIN — Medication 6 MG: at 20:58

## 2023-10-13 RX ADMIN — METHYLPREDNISOLONE SODIUM SUCCINATE 20 MG: 40 INJECTION INTRAMUSCULAR; INTRAVENOUS at 23:04

## 2023-10-13 RX ADMIN — OXYCODONE AND ACETAMINOPHEN 1 TABLET: 5; 325 TABLET ORAL at 20:57

## 2023-10-13 ASSESSMENT — ACTIVITIES OF DAILY LIVING (ADL): LACK_OF_TRANSPORTATION: NO

## 2023-10-13 ASSESSMENT — COGNITIVE AND FUNCTIONAL STATUS - GENERAL
MOBILITY SCORE: 24
DAILY ACTIVITIY SCORE: 24
MOBILITY SCORE: 24
DAILY ACTIVITIY SCORE: 24

## 2023-10-13 ASSESSMENT — PAIN SCALES - GENERAL
PAINLEVEL_OUTOF10: 7
PAINLEVEL_OUTOF10: 6
PAINLEVEL_OUTOF10: 2
PAINLEVEL_OUTOF10: 0 - NO PAIN

## 2023-10-13 ASSESSMENT — PAIN - FUNCTIONAL ASSESSMENT
PAIN_FUNCTIONAL_ASSESSMENT: 0-10
PAIN_FUNCTIONAL_ASSESSMENT: 0-10

## 2023-10-13 NOTE — CARE PLAN
The patient's goals for the shift include  pain control and control of BM    The clinical goals for the shift include Pain control    Over the shift, the patient did not make progress toward the following goals. Barriers to progression include . Recommendations to address these barriers include   Problem: Pain  Goal: My pain/discomfort is manageable  Outcome: Progressing     Problem: Pain - Adult  Goal: Verbalizes/displays adequate comfort level or baseline comfort level  Outcome: Progressing     Problem: Safety - Adult  Goal: Free from fall injury  Outcome: Progressing     Problem: Discharge Planning  Goal: Discharge to home or other facility with appropriate resources  Outcome: Progressing     Problem: Chronic Conditions and Co-morbidities  Goal: Patient's chronic conditions and co-morbidity symptoms are monitored and maintained or improved  Outcome: Progressing     Problem: Chronic Conditions and Co-morbidities  Goal: Patient's chronic conditions and co-morbidity symptoms are monitored and maintained or improved  Outcome: Progressing     Problem: Pain  Goal: Takes deep breaths with improved pain control throughout the shift  Outcome: Progressing  Goal: Turns in bed with improved pain control throughout the shift  Outcome: Progressing  Goal: Walks with improved pain control throughout the shift  Outcome: Progressing  Goal: Performs ADL's with improved pain control throughout shift  Outcome: Progressing  Goal: Participates in PT with improved pain control throughout the shift  Outcome: Progressing  Goal: Free from opioid side effects throughout the shift  Outcome: Progressing  Goal: Free from acute confusion related to pain meds throughout the shift  Outcome: Progressing   .

## 2023-10-13 NOTE — CARE PLAN
The patient's goals for the shift include  pain control    The clinical goals for the shift include pain dontrol and control of BM

## 2023-10-13 NOTE — PROGRESS NOTES
Diana De La Fuente is a 43 y.o. female on day 4 of admission presenting with Inflammatory bowel disease.      Subjective   Pt still with bloody diarrhea but is now minimal. States frequency is improved. Still with lower abdominal cramping. Tolerating diet.         Objective     Last Recorded Vitals  /81 (BP Location: Right arm, Patient Position: Lying)   Pulse 65   Temp 36.3 °C (97.3 °F) (Temporal)   Resp 16   Wt 59.4 kg (130 lb 15.3 oz)   SpO2 98%   Intake/Output last 3 Shifts:    Intake/Output Summary (Last 24 hours) at 10/13/2023 1113  Last data filed at 10/13/2023 0934  Gross per 24 hour   Intake 500 ml   Output 1750 ml   Net -1250 ml         Physical Exam  HENT:      Head: Normocephalic and atraumatic.      Nose: Nose normal.      Mouth/Throat:      Mouth: Mucous membranes are moist.      Pharynx: Oropharynx is clear.   Eyes:      Extraocular Movements: Extraocular movements intact.      Pupils: Pupils are equal, round, and reactive to light.   Cardiovascular:      Rate and Rhythm: Normal rate and regular rhythm.      Pulses: Normal pulses.   Pulmonary:      Effort: Pulmonary effort is normal.      Breath sounds: Normal breath sounds.   Abdominal:      General: Abdomen is flat. Bowel sounds are normal.      Palpations: Abdomen is soft.      Tenderness: There is abdominal tenderness.      Comments: Abd tenderness with palpation   Musculoskeletal:         General: Normal range of motion.   Skin:     General: Skin is warm and dry.      Capillary Refill: Capillary refill takes less than 2 seconds.   Neurological:      General: No focal deficit present.      Mental Status: She is oriented to person, place, and time.   Psychiatric:         Mood and Affect: Mood normal.         Relevant Results  Results for orders placed or performed during the hospital encounter of 10/09/23 (from the past 24 hour(s))   CBC   Result Value Ref Range    WBC 20.3 (H) 4.4 - 11.3 x10*3/uL    nRBC 0.0 0.0 - 0.0 /100 WBCs    RBC 4.13  4.00 - 5.20 x10*6/uL    Hemoglobin 12.4 12.0 - 16.0 g/dL    Hematocrit 38.0 36.0 - 46.0 %    MCV 92 80 - 100 fL    MCH 30.0 26.0 - 34.0 pg    MCHC 32.6 32.0 - 36.0 g/dL    RDW 12.0 11.5 - 14.5 %    Platelets 465 (H) 150 - 450 x10*3/uL    MPV 9.8 7.5 - 11.5 fL   Basic metabolic panel   Result Value Ref Range    Glucose 122 (H) 65 - 99 mg/dL    Sodium 137 133 - 145 mmol/L    Potassium 5.0 3.4 - 5.1 mmol/L    Chloride 100 97 - 107 mmol/L    Bicarbonate 27 24 - 31 mmol/L    Urea Nitrogen 12 8 - 25 mg/dL    Creatinine 0.60 0.40 - 1.60 mg/dL    eGFR >90 >60 mL/min/1.73m*2    Calcium 8.8 8.5 - 10.4 mg/dL    Anion Gap 10 <=19 mmol/L         Assessment/Plan      Ulcerative Colitis  -Continue IV steroids  -GI follows  -Tolerating low fiber diet  -Pain control  -Monitor Hgb    Plan   Anticipate discharge home tomorrow.          Bindu Morley, APRN-CNP

## 2023-10-13 NOTE — PROGRESS NOTES
"Diana De La Fuente is a 43 y.o. female on day 4 of admission presenting with Inflammatory bowel disease.    Subjective   Frequency and urgency have improved        Objective     Physical Exam  Constitutional:       Appearance: Normal appearance.   HENT:      Head: Normocephalic and atraumatic.      Mouth/Throat:      Mouth: Mucous membranes are moist.   Pulmonary:      Effort: Pulmonary effort is normal.   Abdominal:      General: There is no distension.      Palpations: Abdomen is soft.      Tenderness: There is no abdominal tenderness. There is no guarding.   Musculoskeletal:         General: Normal range of motion.      Cervical back: Normal range of motion.   Skin:     General: Skin is warm.   Neurological:      General: No focal deficit present.      Mental Status: She is alert. Mental status is at baseline.   Psychiatric:         Mood and Affect: Mood normal.         Last Recorded Vitals  Blood pressure 100/67, pulse 69, temperature 36.2 °C (97.2 °F), temperature source Temporal, resp. rate 16, height 1.575 m (5' 2.01\"), weight 59.4 kg (130 lb 15.3 oz), SpO2 99 %.  Intake/Output last 3 Shifts:  I/O last 3 completed shifts:  In: 950 (16 mL/kg) [P.O.:950]  Out: 3500 (58.9 mL/kg) [Urine:3500 (1.6 mL/kg/hr)]  Weight: 59.4 kg     Relevant Results                Results for orders placed or performed during the hospital encounter of 10/09/23 (from the past 24 hour(s))   CBC   Result Value Ref Range    WBC 20.3 (H) 4.4 - 11.3 x10*3/uL    nRBC 0.0 0.0 - 0.0 /100 WBCs    RBC 4.13 4.00 - 5.20 x10*6/uL    Hemoglobin 12.4 12.0 - 16.0 g/dL    Hematocrit 38.0 36.0 - 46.0 %    MCV 92 80 - 100 fL    MCH 30.0 26.0 - 34.0 pg    MCHC 32.6 32.0 - 36.0 g/dL    RDW 12.0 11.5 - 14.5 %    Platelets 465 (H) 150 - 450 x10*3/uL    MPV 9.8 7.5 - 11.5 fL   Basic metabolic panel   Result Value Ref Range    Glucose 122 (H) 65 - 99 mg/dL    Sodium 137 133 - 145 mmol/L    Potassium 5.0 3.4 - 5.1 mmol/L    Chloride 100 97 - 107 mmol/L    " Bicarbonate 27 24 - 31 mmol/L    Urea Nitrogen 12 8 - 25 mg/dL    Creatinine 0.60 0.40 - 1.60 mg/dL    eGFR >90 >60 mL/min/1.73m*2    Calcium 8.8 8.5 - 10.4 mg/dL    Anion Gap 10 <=19 mmol/L     CT abdomen pelvis w IV contrast    Result Date: 10/9/2023  Interpreted By:  Dc Jimenez, STUDY: CT ABDOMEN PELVIS W IV CONTRAST; 10/9/2023 5:37 pm   INDICATION: . Bloody diarrhea. History of recent flexible sigmoidoscopy with evidence for bowel inflammation.   COMPARISON: None..   ACCESSION NUMBER(S): [id number]   ORDERING CLINICIAN: DEVONTE PISANO   TECHNIQUE: 75 ml Omnipaque 350 was injected intravenously. CT of the Abdomen and Pelvis without and with intravenous contrast was performed. Axial, sagittal and coronal reformatted images were reviewed.   FINDINGS: Lower Chest: within normal limits.   Abdomen: Liver: within normal limits. Bile Ducts: Normal caliber. Gallbladder: No calcified gallstones. Normal caliber wall. Pancreas: within normal limits. Spleen: within normal limits. Adrenals: within normal limits. Kidneys: within normal limits.   Pelvis: No pelvic masses. Ureters: within normal limits. Bladder: within normal limits.   Bowel: There is luminal narrowing and marked wall thickening involving the sigmoid and descending colon extending to the level of the splenic flexure Mesenteric Lymph Nodes: No enlarged mesenteric lymph nodes. Peritoneum: No ascites or free air, no fluid collection. Vessels: Unremarkable. Retroperitoneum: within normal limits. Abdominal Wall: within normal limits. Bones: within normal limits.       Findings most consistent with colitis.   MACRO: none   Signed by: Dc Jimenez 10/9/2023 6:24 PM Dictation workstation:   [id number]                Assessment/Plan   Principal Problem:    Inflammatory bowel disease    UC Flare    -Overall improving on IV steriods today. Continue for one more day with possible DC tomorrow    -Rowasa enemas BID    -Will need close outpatient follow up with  our office to determine chronic med management     2. Leukocystosis    -Likely related to steriods. Non toxic appearing. Overall improved        I spent 20 minutes in the professional and overall care of this patient.      Jo-Ann Doran, APRN-CNP

## 2023-10-14 VITALS
RESPIRATION RATE: 17 BRPM | TEMPERATURE: 97.2 F | HEIGHT: 62 IN | OXYGEN SATURATION: 99 % | WEIGHT: 130.95 LBS | DIASTOLIC BLOOD PRESSURE: 71 MMHG | SYSTOLIC BLOOD PRESSURE: 117 MMHG | HEART RATE: 65 BPM | BODY MASS INDEX: 24.1 KG/M2

## 2023-10-14 LAB
ANION GAP SERPL CALC-SCNC: 9 MMOL/L
BACTERIA BLD CULT: NORMAL
BASOPHILS # BLD MANUAL: 0 X10*3/UL (ref 0–0.1)
BASOPHILS NFR BLD MANUAL: 0 %
BUN SERPL-MCNC: 16 MG/DL (ref 8–25)
CALCIUM SERPL-MCNC: 9 MG/DL (ref 8.5–10.4)
CHLORIDE SERPL-SCNC: 100 MMOL/L (ref 97–107)
CO2 SERPL-SCNC: 28 MMOL/L (ref 24–31)
CREAT SERPL-MCNC: 0.6 MG/DL (ref 0.4–1.6)
EOSINOPHIL # BLD MANUAL: 0 X10*3/UL (ref 0–0.7)
EOSINOPHIL NFR BLD MANUAL: 0 %
ERYTHROCYTE [DISTWIDTH] IN BLOOD BY AUTOMATED COUNT: 12 % (ref 11.5–14.5)
GFR SERPL CREATININE-BSD FRML MDRD: >90 ML/MIN/1.73M*2
GLUCOSE SERPL-MCNC: 115 MG/DL (ref 65–99)
HCT VFR BLD AUTO: 40.2 % (ref 36–46)
HGB BLD-MCNC: 13.2 G/DL (ref 12–16)
IMM GRANULOCYTES # BLD AUTO: 2.17 X10*3/UL (ref 0–0.7)
IMM GRANULOCYTES NFR BLD AUTO: 7.4 % (ref 0–0.9)
LYMPHOCYTES # BLD MANUAL: 0.88 X10*3/UL (ref 1.2–4.8)
LYMPHOCYTES NFR BLD MANUAL: 3 %
MCH RBC QN AUTO: 29.7 PG (ref 26–34)
MCHC RBC AUTO-ENTMCNC: 32.8 G/DL (ref 32–36)
MCV RBC AUTO: 90 FL (ref 80–100)
METAMYELOCYTES # BLD MANUAL: 1.76 X10*3/UL
METAMYELOCYTES NFR BLD MANUAL: 6 %
MONOCYTES # BLD MANUAL: 1.47 X10*3/UL (ref 0.1–1)
MONOCYTES NFR BLD MANUAL: 5 %
MYELOCYTES # BLD MANUAL: 0.59 X10*3/UL
MYELOCYTES NFR BLD MANUAL: 2 %
NEUTROPHILS # BLD MANUAL: 24.7 X10*3/UL (ref 1.2–7.7)
NEUTS BAND # BLD MANUAL: 2.06 X10*3/UL (ref 0–0.7)
NEUTS BAND NFR BLD MANUAL: 7 %
NEUTS SEG # BLD MANUAL: 22.64 X10*3/UL (ref 1.2–7)
NEUTS SEG NFR BLD MANUAL: 77 %
NRBC BLD-RTO: 0 /100 WBCS (ref 0–0)
PLATELET # BLD AUTO: 542 X10*3/UL (ref 150–450)
PMV BLD AUTO: 9.7 FL (ref 7.5–11.5)
POTASSIUM SERPL-SCNC: 4.4 MMOL/L (ref 3.4–5.1)
RBC # BLD AUTO: 4.45 X10*6/UL (ref 4–5.2)
RBC MORPH BLD: ABNORMAL
SODIUM SERPL-SCNC: 137 MMOL/L (ref 133–145)
TOTAL CELLS COUNTED BLD: 100
WBC # BLD AUTO: 29.4 X10*3/UL (ref 4.4–11.3)

## 2023-10-14 PROCEDURE — 2500000004 HC RX 250 GENERAL PHARMACY W/ HCPCS (ALT 636 FOR OP/ED): Performed by: NURSE PRACTITIONER

## 2023-10-14 PROCEDURE — 36415 COLL VENOUS BLD VENIPUNCTURE: CPT | Performed by: NURSE PRACTITIONER

## 2023-10-14 PROCEDURE — 85027 COMPLETE CBC AUTOMATED: CPT | Performed by: NURSE PRACTITIONER

## 2023-10-14 PROCEDURE — 80048 BASIC METABOLIC PNL TOTAL CA: CPT | Performed by: NURSE PRACTITIONER

## 2023-10-14 PROCEDURE — 2500000004 HC RX 250 GENERAL PHARMACY W/ HCPCS (ALT 636 FOR OP/ED): Performed by: FAMILY MEDICINE

## 2023-10-14 PROCEDURE — 2500000001 HC RX 250 WO HCPCS SELF ADMINISTERED DRUGS (ALT 637 FOR MEDICARE OP): Performed by: NURSE PRACTITIONER

## 2023-10-14 PROCEDURE — 85007 BL SMEAR W/DIFF WBC COUNT: CPT | Performed by: NURSE PRACTITIONER

## 2023-10-14 RX ORDER — MESALAMINE 4 G/60ML
4 SUSPENSION RECTAL 2 TIMES DAILY
Qty: 8 EACH | Refills: 0 | Status: SHIPPED | OUTPATIENT
Start: 2023-10-14

## 2023-10-14 RX ORDER — OXYCODONE AND ACETAMINOPHEN 5; 325 MG/1; MG/1
1 TABLET ORAL EVERY 6 HOURS PRN
Qty: 12 TABLET | Refills: 0 | Status: SHIPPED | OUTPATIENT
Start: 2023-10-14

## 2023-10-14 RX ADMIN — OXYCODONE AND ACETAMINOPHEN 1 TABLET: 5; 325 TABLET ORAL at 08:38

## 2023-10-14 RX ADMIN — PANTOPRAZOLE SODIUM 40 MG: 40 TABLET, DELAYED RELEASE ORAL at 06:36

## 2023-10-14 RX ADMIN — MESALAMINE 4 G: 4 ENEMA RECTAL at 08:38

## 2023-10-14 RX ADMIN — METHYLPREDNISOLONE SODIUM SUCCINATE 20 MG: 40 INJECTION INTRAMUSCULAR; INTRAVENOUS at 06:36

## 2023-10-14 ASSESSMENT — PAIN SCALES - GENERAL
PAINLEVEL_OUTOF10: 3
PAINLEVEL_OUTOF10: 6
PAINLEVEL_OUTOF10: 0 - NO PAIN

## 2023-10-14 ASSESSMENT — COGNITIVE AND FUNCTIONAL STATUS - GENERAL
DAILY ACTIVITIY SCORE: 24
MOBILITY SCORE: 24

## 2023-10-14 ASSESSMENT — PAIN SCALES - PAIN ASSESSMENT IN ADVANCED DEMENTIA (PAINAD)
BREATHING: NORMAL
BODYLANGUAGE: RELAXED
CONSOLABILITY: NO NEED TO CONSOLE
FACIALEXPRESSION: SMILING OR INEXPRESSIVE
TOTALSCORE: 0
CONSOLABILITY: NO NEED TO CONSOLE
BREATHING: NORMAL
BODYLANGUAGE: RELAXED

## 2023-10-14 ASSESSMENT — PAIN SCALES - WONG BAKER
WONGBAKER_NUMERICALRESPONSE: NO HURT
WONGBAKER_NUMERICALRESPONSE: HURTS LITTLE BIT

## 2023-10-14 ASSESSMENT — PAIN - FUNCTIONAL ASSESSMENT: PAIN_FUNCTIONAL_ASSESSMENT: 0-10

## 2023-10-14 NOTE — DISCHARGE SUMMARY
Discharge Diagnosis  Inflammatory bowel disease    Issues Requiring Follow-Up  Ulcerative Colitis    Discharge Meds     Your medication list        START taking these medications        Instructions Last Dose Given Next Dose Due   mesalamine 4 gram/60 mL enema  Commonly known as: Rowasa      Insert 60 mL (4 g) into the rectum 2 times a day.       oxyCODONE-acetaminophen 5-325 mg tablet  Commonly known as: Percocet      Take 1 tablet by mouth every 6 hours if needed for moderate pain (4 - 6).              CONTINUE taking these medications        Instructions Last Dose Given Next Dose Due   ozanimod 0.92 mg capsule  Commonly known as: Zeposia           predniSONE 20 mg tablet  Commonly known as: Deltasone                     Where to Get Your Medications        These medications were sent to [facility][id number] - [address] AT [address] [address]      Phone: [phone]   mesalamine 4 gram/60 mL enema  oxyCODONE-acetaminophen 5-325 mg tablet           Hospital Course   Admitted for Ulcerative Colitis flare. Was treated with IV steroids and IV fluids. Hgb normal. She has clinically improved. To follow-up with Gastroenterology next week and pt already has appt scheduled. Will likely start Remicade in outpatient setting. Medically stable for discharge. To continue oral Prednisone and Mesalamine enemas on discharge.     Pertinent Physical Exam At Time of Discharge  Physical Exam  HENT:      Head: Normocephalic and atraumatic.      Nose: Nose normal.      Mouth/Throat:      Mouth: Mucous membranes are moist.      Pharynx: Oropharynx is clear.   Eyes:      Extraocular Movements: Extraocular movements intact.      Pupils: Pupils are equal, round, and reactive to light.   Cardiovascular:      Rate and Rhythm: Normal rate and regular rhythm.      Pulses: Normal pulses.   Pulmonary:      Effort: Pulmonary effort is normal.      Breath sounds: Normal breath  sounds.   Abdominal:      General: Abdomen is flat. Bowel sounds are normal.      Palpations: Abdomen is soft.      Comments: Mild abd tenderness with palpation   Musculoskeletal:         General: Normal range of motion.   Skin:     General: Skin is warm and dry.      Capillary Refill: Capillary refill takes less than 2 seconds.   Neurological:      General: No focal deficit present.      Mental Status: She is oriented to person, place, and time.   Psychiatric:         Mood and Affect: Mood normal.         Outpatient Follow-Up  No future appointments.      Bindu Morley, COLBY-CNP

## 2023-10-14 NOTE — CARE PLAN
Problem: Pain  Goal: My pain/discomfort is manageable  Outcome: Progressing     Problem: Pain - Adult  Goal: Verbalizes/displays adequate comfort level or baseline comfort level  Outcome: Progressing     Problem: Safety - Adult  Goal: Free from fall injury  Outcome: Progressing     Problem: Discharge Planning  Goal: Discharge to home or other facility with appropriate resources  Outcome: Progressing     Problem: Chronic Conditions and Co-morbidities  Goal: Patient's chronic conditions and co-morbidity symptoms are monitored and maintained or improved  Outcome: Progressing     Problem: Pain  Goal: Takes deep breaths with improved pain control throughout the shift  Outcome: Progressing  Goal: Turns in bed with improved pain control throughout the shift  Outcome: Progressing  Goal: Walks with improved pain control throughout the shift  Outcome: Progressing  Goal: Performs ADL's with improved pain control throughout shift  Outcome: Progressing  Goal: Participates in PT with improved pain control throughout the shift  Outcome: Progressing  Goal: Free from opioid side effects throughout the shift  Outcome: Progressing  Goal: Free from acute confusion related to pain meds throughout the shift  Outcome: Progressing   The patient's goals for the shift include      The clinical goals for the shift include pain control  Over the shift, the patient did not make progress toward the following goals. Barriers to progression include . Recommendations to address these barriers include .

## 2023-10-14 NOTE — CARE PLAN
The patient's goals for the shift include      The clinical goals for the shift include pain control    Over the shift, the patient did not make progress toward the following goals. Barriers to progression include ***. Recommendations to address these barriers include ***.

## 2023-10-19 ENCOUNTER — LAB (OUTPATIENT)
Dept: LAB | Facility: LAB | Age: 43
End: 2023-10-19
Payer: MEDICAID

## 2023-10-19 DIAGNOSIS — K51.90 ULCERATIVE COLITIS, UNSPECIFIED, WITHOUT COMPLICATIONS (MULTI): Primary | ICD-10-CM

## 2023-10-19 PROCEDURE — 87340 HEPATITIS B SURFACE AG IA: CPT

## 2023-10-19 PROCEDURE — 86481 TB AG RESPONSE T-CELL SUSP: CPT

## 2023-10-19 PROCEDURE — 36415 COLL VENOUS BLD VENIPUNCTURE: CPT

## 2023-10-20 LAB — HBV SURFACE AG SERPL QL IA: NONREACTIVE

## 2023-10-21 LAB
NIL(NEG) CONTROL SPOT COUNT: NORMAL
PANEL A SPOT COUNT: 0
PANEL B SPOT COUNT: 0
POS CONTROL SPOT COUNT: NORMAL
T-SPOT. TB INTERPRETATION: NEGATIVE

## 2023-10-27 ENCOUNTER — TELEPHONE (OUTPATIENT)
Dept: INPATIENT UNIT | Facility: HOSPITAL | Age: 43
End: 2023-10-27

## 2024-07-03 PROCEDURE — 88175 CYTOPATH C/V AUTO FLUID REDO: CPT

## 2024-07-05 ENCOUNTER — LAB REQUISITION (OUTPATIENT)
Dept: LAB | Facility: HOSPITAL | Age: 44
End: 2024-07-05
Payer: COMMERCIAL

## 2024-07-05 DIAGNOSIS — Z11.51 ENCOUNTER FOR SCREENING FOR HUMAN PAPILLOMAVIRUS (HPV): ICD-10-CM

## 2024-07-05 DIAGNOSIS — Z01.419 ENCOUNTER FOR GYNECOLOGICAL EXAMINATION (GENERAL) (ROUTINE) WITHOUT ABNORMAL FINDINGS: ICD-10-CM

## 2024-07-10 LAB
CYTOLOGY CMNT CVX/VAG CYTO-IMP: NORMAL
LAB AP HPV GENOTYPE QUESTION: NO
LAB AP HPV HR: NORMAL
LABORATORY COMMENT REPORT: NORMAL
MENSTRUAL HX REPORTED: NORMAL
PATH REPORT.TOTAL CANCER: NORMAL

## 2025-01-07 ENCOUNTER — OFFICE VISIT (OUTPATIENT)
Dept: URGENT CARE | Age: 45
End: 2025-01-07
Payer: COMMERCIAL

## 2025-01-07 VITALS
DIASTOLIC BLOOD PRESSURE: 79 MMHG | OXYGEN SATURATION: 98 % | TEMPERATURE: 98.2 F | BODY MASS INDEX: 25.56 KG/M2 | HEART RATE: 104 BPM | RESPIRATION RATE: 20 BRPM | WEIGHT: 138.89 LBS | HEIGHT: 62 IN | SYSTOLIC BLOOD PRESSURE: 132 MMHG

## 2025-01-07 DIAGNOSIS — J02.9 SORE THROAT: ICD-10-CM

## 2025-01-07 DIAGNOSIS — J01.00 ACUTE MAXILLARY SINUSITIS, RECURRENCE NOT SPECIFIED: Primary | ICD-10-CM

## 2025-01-07 LAB — POC RAPID STREP: NEGATIVE

## 2025-01-07 PROCEDURE — 3008F BODY MASS INDEX DOCD: CPT

## 2025-01-07 PROCEDURE — 99213 OFFICE O/P EST LOW 20 MIN: CPT

## 2025-01-07 PROCEDURE — 87880 STREP A ASSAY W/OPTIC: CPT

## 2025-01-07 RX ORDER — CETIRIZINE HYDROCHLORIDE, PSEUDOEPHEDRINE HYDROCHLORIDE 5; 120 MG/1; MG/1
1 TABLET, FILM COATED, EXTENDED RELEASE ORAL 2 TIMES DAILY
Qty: 28 TABLET | Refills: 0 | Status: SHIPPED | OUTPATIENT
Start: 2025-01-07 | End: 2025-01-21

## 2025-01-07 RX ORDER — FLUTICASONE PROPIONATE 50 MCG
1 SPRAY, SUSPENSION (ML) NASAL DAILY
Qty: 16 G | Refills: 0 | Status: SHIPPED | OUTPATIENT
Start: 2025-01-07 | End: 2025-02-06

## 2025-01-07 RX ORDER — AMOXICILLIN AND CLAVULANATE POTASSIUM 875; 125 MG/1; MG/1
875 TABLET, FILM COATED ORAL 2 TIMES DAILY
Qty: 14 TABLET | Refills: 0 | Status: SHIPPED | OUTPATIENT
Start: 2025-01-07 | End: 2025-01-14

## 2025-01-07 NOTE — PROGRESS NOTES
"Subjective   Patient ID: Diana De La Fuente is a 44 y.o. female. They present today with a chief complaint of Sore Throat (X 6 days), Fever, Cough, Fatigue, Sinus Drainage, Sinus Pressure, and Night Sweats.    History of Present Illness  HPI    Past Medical History  Allergies as of 01/07/2025 - Reviewed 01/07/2025   Allergen Reaction Noted    Nickel Rash 10/09/2023    Sulfa (sulfonamide antibiotics) Rash 10/09/2023       (Not in a hospital admission)       Past Medical History:   Diagnosis Date    Ulcerative colitis (Multi)        No past surgical history on file.     reports that she has never smoked. She has never used smokeless tobacco. She reports that she does not use drugs.    Review of Systems  Review of Systems   All other systems reviewed and are negative.                                 Objective    Vitals:    01/07/25 0952   BP: 132/79   Pulse: 104   Resp: 20   Temp: 36.8 °C (98.2 °F)   TempSrc: Oral   SpO2: 98%   Weight: 63 kg (138 lb 14.2 oz)   Height: 1.575 m (5' 2\")     No LMP recorded. Patient has had a hysterectomy.    Physical Exam  Vitals and nursing note reviewed.   Constitutional:       General: She is not in acute distress.     Appearance: Normal appearance. She is normal weight. She is not ill-appearing, toxic-appearing or diaphoretic.   HENT:      Head: Normocephalic and atraumatic.      Nose: Congestion present.      Comments: Maxillary sinus tenderness     Mouth/Throat:      Mouth: Mucous membranes are moist.      Pharynx: No oropharyngeal exudate or posterior oropharyngeal erythema.      Comments: Oropharynx is widely patent.  Mucous membranes are moist.  Mild erythema posterior pharynx without exudate, edema or asymmetry of posterior pharynx or tonsils.  Uvula is midline and without edema.  No muffled voice or trismus.   Eyes:      General: No scleral icterus.        Right eye: No discharge.         Left eye: No discharge.      Extraocular Movements: Extraocular movements intact.      " Conjunctiva/sclera: Conjunctivae normal.      Pupils: Pupils are equal, round, and reactive to light.   Cardiovascular:      Rate and Rhythm: Normal rate and regular rhythm.      Pulses: Normal pulses.      Heart sounds: Normal heart sounds. No murmur heard.     No friction rub. No gallop.   Pulmonary:      Effort: Pulmonary effort is normal. No respiratory distress.      Breath sounds: No wheezing, rhonchi or rales.   Abdominal:      General: Abdomen is flat.   Musculoskeletal:         General: Normal range of motion.      Cervical back: Normal range of motion and neck supple. No rigidity or tenderness.   Lymphadenopathy:      Cervical: No cervical adenopathy.   Skin:     General: Skin is warm and dry.      Capillary Refill: Capillary refill takes less than 2 seconds.      Coloration: Skin is not jaundiced or pale.      Findings: No rash.   Neurological:      General: No focal deficit present.      Mental Status: She is alert.      Gait: Gait normal.   Psychiatric:         Mood and Affect: Mood normal.         Behavior: Behavior normal.         Procedures    Point of Care Test & Imaging Results from this visit  Results for orders placed or performed in visit on 01/07/25   POCT rapid strep A manually resulted   Result Value Ref Range    POC Rapid Strep Negative Negative      No results found.    Diagnostic study results (if any) were reviewed by Cristiana Mcfarland PA-C.    Assessment/Plan   Allergies, medications, history, and pertinent labs/EKGs/Imaging reviewed by Cristiana Mcfarland PA-C.     Medical Decision Making  44 year old F with Pmhx of hysterectomy and UC presents with complaint of nasal congestion, sinus pressure and pain, sore throat, fever, chills, cough.  Symptoms ongoing for the past 6 days.  She states today in particular she has increasing facial pressure and pain.  Low-grade fevers at home.  Denies chest congestion, chest pain, shortness of breath, wheezing.  No history of asthma or COPD.  Exam with  mild maxillary sinus tenderness.  No features mastoiditis, meningitis, sepsis, reactive airway, pneumonia or other emergency.  No known drug allergies.  No immunocompromising conditions.  She does take steroids occasionally when she has flare of ulcerative colitis however does not take these chronically.  Discussed that symptoms may still be related to viral illness.  She is not interested in viral swabs.  Recommended trial of treatment with Zyrtec and Flonase for 24 to 48 hours.  If symptoms improve avoid use of antibiotics.  If symptoms do not improve begin taking antibiotics and take entire course for consideration of bacterial sinusitis.  Encouraged follow-up with primary care provider.  Discussed indications for presentation to emergency department.  Discharged good condition agreeable to plan as discussed.     Orders and Diagnoses  Diagnoses and all orders for this visit:  Acute maxillary sinusitis, recurrence not specified  -     amoxicillin-pot clavulanate (Augmentin) 875-125 mg tablet; Take 1 tablet (875 mg) by mouth 2 times a day for 7 days.  -     cetirizine-pseudoephedrine (ZyrTEC-D) 5-120 mg 12 hr tablet; Take 1 tablet by mouth 2 times a day for 14 days.  -     fluticasone (Flonase) 50 mcg/actuation nasal spray; Administer 1 spray into each nostril once daily. Shake gently. Before first use, prime pump. After use, clean tip and replace cap.  Sore throat  -     POCT rapid strep A manually resulted      Medical Admin Record      Patient disposition: Home    Electronically signed by Cristiana Mcfarland PA-C  10:22 AM